# Patient Record
Sex: MALE | Race: WHITE | URBAN - METROPOLITAN AREA
[De-identification: names, ages, dates, MRNs, and addresses within clinical notes are randomized per-mention and may not be internally consistent; named-entity substitution may affect disease eponyms.]

---

## 2021-04-28 ENCOUNTER — OFFICE VISIT (OUTPATIENT)
Dept: ORTHOPEDIC SURGERY | Age: 31
End: 2021-04-28
Payer: COMMERCIAL

## 2021-04-28 VITALS — HEIGHT: 69 IN | BODY MASS INDEX: 24.44 KG/M2 | TEMPERATURE: 98.6 F | WEIGHT: 165 LBS

## 2021-04-28 DIAGNOSIS — S52.125A CLOSED NONDISPLACED FRACTURE OF HEAD OF LEFT RADIUS, INITIAL ENCOUNTER: Primary | ICD-10-CM

## 2021-04-28 DIAGNOSIS — M25.522 LEFT ELBOW PAIN: ICD-10-CM

## 2021-04-28 PROCEDURE — 99204 OFFICE O/P NEW MOD 45 MIN: CPT | Performed by: ORTHOPAEDIC SURGERY

## 2021-04-28 RX ORDER — KETOROLAC TROMETHAMINE 10 MG/1
TABLET, FILM COATED ORAL
COMMUNITY
Start: 2021-04-27

## 2021-04-28 SDOH — HEALTH STABILITY: MENTAL HEALTH: HOW MANY STANDARD DRINKS CONTAINING ALCOHOL DO YOU HAVE ON A TYPICAL DAY?: NOT ASKED

## 2021-04-28 NOTE — PROGRESS NOTES
Date:  2021    Name:  Moraima Muñiz  Address:  [address] [place] [address] Road  [address] [place] [address]    :  1990      Age:   26 y.o.    SSN:  [id number]      Medical Record Number:  <[id number]>    Reason for Visit:    Chief Complaint    Elbow Injury (new patient left elbow )      DOS:2021     HPI: Moraima Muñiz is a 26 y.o. male here today for new patient evaluation of the left elbow. Patient was skateboarding yesterday when he fell directly onto the left shoulder and elbow. States that since then he has had difficulty with weightbearing and motion of the elbow especially with supination and pronation. He has been wearing a sling since yesterday which has helped. ROS: All systems reviewed on patient intake form. Pertinent items are noted in HPI. No past medical history on file. No past surgical history on file. No family history on file.     Social History     Socioeconomic History    Marital status: Unknown     Spouse name: Not on file    Number of children: Not on file    Years of education: Not on file    Highest education level: Not on file   Occupational History    Not on file   Social Needs    Financial resource strain: Not on file    Food insecurity     Worry: Not on file     Inability: Not on file    Transportation needs     Medical: Not on file     Non-medical: Not on file   Tobacco Use    Smoking status: Not on file   Substance and Sexual Activity    Alcohol use: Not on file    Drug use: Not on file    Sexual activity: Not on file   Lifestyle    Physical activity     Days per week: Not on file     Minutes per session: Not on file    Stress: Not on file   Relationships    Social connections     Talks on phone: Not on file     Gets together: Not on file     Attends Hoahaoism service: Not on file     Active member of club or organization: Not on file     Attends meetings of clubs or organizations: Not on file     Relationship status: Not on file    Intimate partner Number of Occurrences:   1     Standing Expiration Date:   4/28/2022     Order Specific Question:   Reason for exam:     Answer:   pain     Total time spent for evaluation, education and development of treatment plan: 39 minutes    Sundeep Becerra, [address] [facility]     04/28/21  4:30 PM     I attest that I met personally with the patient, performed the described exam, reviewed the radiographic studies and medical records associated with this patient and supervised the services that are described above.      Tutu Glaser MD

## 2021-04-29 ENCOUNTER — TELEPHONE (OUTPATIENT)
Dept: ORTHOPEDIC SURGERY | Age: 31
End: 2021-04-29

## 2021-05-05 ENCOUNTER — OFFICE VISIT (OUTPATIENT)
Dept: ORTHOPEDIC SURGERY | Age: 31
End: 2021-05-05
Payer: COMMERCIAL

## 2021-05-05 DIAGNOSIS — S52.125D CLOSED NONDISPLACED FRACTURE OF HEAD OF LEFT RADIUS WITH ROUTINE HEALING, SUBSEQUENT ENCOUNTER: Primary | ICD-10-CM

## 2021-05-05 DIAGNOSIS — M25.522 LEFT ELBOW PAIN: ICD-10-CM

## 2021-05-05 PROCEDURE — 99214 OFFICE O/P EST MOD 30 MIN: CPT | Performed by: ORTHOPAEDIC SURGERY

## 2021-05-05 NOTE — PROGRESS NOTES
Date:  2021    Name:  Andrea Vasquez  Address:  [address] [place] [address]    :  1990      Age:   26 y.o.    SSN:  [id number]      Medical Record Number:  <[id number]>    Reason for Visit:    Chief Complaint    Follow-up (Left radial head fx)      DOS:2021     HPI: Andrea Vasquez is a 26 y.o. male here for follow-up. He sustained a left Gurjit type I radial head fracture on . He fell directly onto the elbow while skateboarding. He is being treated nonoperatively. States that his pain is improving. He still has some limitation in regards to range of motion. He has the sling but has been using it intermittently. ROS: Review of systems reviewed from Patient History Form completed today and available in the patient's chart under the Media tab. No past medical history on file. No past surgical history on file. No family history on file.     Social History     Socioeconomic History    Marital status: Unknown     Spouse name: Not on file    Number of children: Not on file    Years of education: Not on file    Highest education level: Not on file   Occupational History    Not on file   Social Needs    Financial resource strain: Not on file    Food insecurity     Worry: Not on file     Inability: Not on file    Transportation needs     Medical: Not on file     Non-medical: Not on file   Tobacco Use    Smoking status: Never Smoker    Smokeless tobacco: Never Used   Substance and Sexual Activity    Alcohol use: Never     Frequency: Never     Binge frequency: Never    Drug use: Never    Sexual activity: Not on file   Lifestyle    Physical activity     Days per week: Not on file     Minutes per session: Not on file    Stress: Not on file   Relationships    Social connections     Talks on phone: Not on file     Gets together: Not on file     Attends [employer]ian service: Not on file     Active member of club or organization: Not on file     Attends meetings of The roles of activity medication, antiinflammatories, injections, bracing, physical therapy, and surgical interventions were all described to the patient and questions were answered.    -This can be treated nonoperatively. -He will work on stretching and ROM exercises for the elbow on his own  -He will return in 2 weeks time with repeat radiographs and start formal PT at that time     Todd Olson is in agreement with this plan. All questions were answered to patient's satisfaction and was encouraged to call with any further questions. Orders Placed This Encounter   Procedures    XR ELBOW LEFT (MIN 3 VIEWS)     Standing Status:   Future     Number of Occurrences:   1     Standing Expiration Date:   5/4/2022     Order Specific Question:   Reason for exam:     Answer:   elbow pain         Total time spent for evaluation, education and development of treatment plan: 39 minutes      Sarita Del Real MD  Orthopaedic Fellow  [facility] and [address] [place] [address]     I attest that I met personally with the patient, performed the described exam, reviewed the radiographic studies and medical records associated with this patient and supervised the services that are described above.      Miller Hardwick MD

## 2021-05-19 ENCOUNTER — OFFICE VISIT (OUTPATIENT)
Dept: ORTHOPEDIC SURGERY | Age: 31
End: 2021-05-19

## 2021-05-19 VITALS — HEIGHT: 69 IN | WEIGHT: 165 LBS | BODY MASS INDEX: 24.44 KG/M2

## 2021-05-19 DIAGNOSIS — M25.522 LEFT ELBOW PAIN: ICD-10-CM

## 2021-05-19 DIAGNOSIS — S52.125D CLOSED NONDISPLACED FRACTURE OF HEAD OF LEFT RADIUS WITH ROUTINE HEALING, SUBSEQUENT ENCOUNTER: Primary | ICD-10-CM

## 2021-05-19 PROCEDURE — 99024 POSTOP FOLLOW-UP VISIT: CPT | Performed by: ORTHOPAEDIC SURGERY

## 2021-05-19 NOTE — PROGRESS NOTES
Food in the Last Year:    [address] in the Last Year:    Transportation Needs:     Lack of Transportation (Medical):  Lack of Transportation (Non-Medical):    Physical Activity:     Days of Exercise per Week:     Minutes of Exercise per Session:    Stress:     Feeling of Stress :    Social Connections:     Frequency of Communication with Friends and Family:     Frequency of Social Gatherings with Friends and Family:     Attends Islam Services:     Active Member of Clubs or Organizations:     Attends Club or Organization Meetings:     Marital Status:    Intimate Partner Violence:     Fear of Current or Ex-Partner:     Emotionally Abused:     Physically Abused:     Sexually Abused:        Current Outpatient Medications   Medication Sig Dispense Refill    ketorolac (TORADOL) 10 MG tablet        No current facility-administered medications for this visit. No Known Allergies    Vital signs:  Ht 5' 9\" (1.753 m)   Wt 165 lb (74.8 kg)   BMI 24.37 kg/m²              Elbow exam - Left  Inspection: No gross deformities, no signs of infection. Palpation: No tenderness to palpation overlying the lateral or medial epicondyle. nontender overlying radial head  Active Range of Motion:  5-140. Lacks 5 degrees of supination , lacks 5 degrees pronation compared to the contralateral side  Special Tests: Negative Hook test.   Neurovascular: Sensation to light touch is intact, no motor deficits, palpable radial pulses 2+     Elbow exam -Right  Inspection: No gross deformities, no signs of infection. Palpation: No tenderness to palpation overlying the lateral or medial epicondyle. Active Range of Motion:  0-140  Passive Range of Motion: No restrictions  Special Tests: Negative Hook test.   Neurovascular: Sensation to light touch is intact, no motor deficits, palpable radial pulses 2+          Diagnostics:  Radiology:     3 views of the left elbow: No fractures. No dislocations.   No change in alignment of the radial head fracture      Assessment: 17-year-old right-hand-dominant male with left nondisplaced radial head fracture sustained 3 weeks ago on 4/27    Plan:     -Continue nonweightbearing to left upper extremity. We will get him into physical therapy to work on continued stretching  -He will return in 4 weeks time with repeat radiographs     Josie Benito is in agreement with this plan. All questions were answered to patient's satisfaction and was encouraged to call with any further questions. Orders Placed This Encounter   Procedures    XR ELBOW LEFT (MIN 3 VIEWS)     Standing Status:   Future     Number of Occurrences:   1     Standing Expiration Date:   5/18/2022     Order Specific Question:   Reason for exam:     Answer:   elbow pain         Za Garay MD  Orthopaedic Fellow  [facility]     I attest that I met personally with the patient, performed the described exam, reviewed the radiographic studies and medical records associated with this patient and supervised the services that are described above.      Hanford Cranker MD

## 2021-05-21 ENCOUNTER — HOSPITAL ENCOUNTER (OUTPATIENT)
Dept: PHYSICAL THERAPY | Age: 31
Setting detail: THERAPIES SERIES
Discharge: HOME | End: 2021-05-21
Payer: COMMERCIAL

## 2021-05-21 PROCEDURE — 97161 PT EVAL LOW COMPLEX 20 MIN: CPT | Performed by: PHYSICAL THERAPIST

## 2021-05-21 PROCEDURE — 97110 THERAPEUTIC EXERCISES: CPT | Performed by: PHYSICAL THERAPIST

## 2021-05-21 NOTE — PLAN OF CARE
The [address] [place] [address]  Phone [phone]  Fax [phone]                                                       Physical Therapy Certification    Dear Referring Practitioner: Maxine Veras,    We had the pleasure of evaluating the following patient for physical therapy services at [address] [place] [address]. A summary of our findings can be found in the initial assessment below. This includes our plan of care. If you have any questions or concerns regarding these findings, please do not hesitate to contact me at the office phone number checked above. Thank you for the referral.       Physician Signature:_______________________________Date:__________________  By signing above (or electronic signature), therapists plan is approved by physician      Patient: Andrea Vasquez   : 1990   MRN: [id number]  Referring Physician: Referring Practitioner: Maxine Veras      Evaluation Date: 2021      Medical Diagnosis Information:  Diagnosis: S52.125D (ICD-10-CM) - Closed nondisplaced fracture of head of left radius with routine healing, subsequent encounter   Treatment Diagnosis: M25. 522 pain in left elbow                                         Insurance information: PT Insurance Information: [employer]uv[employer] Landon/[employer] vpcy/no copay/ no auth    Precautions/ Contra-indications: non weight bearing    C-SSRS Triggered by Intake questionnaire (Past 2 wk assessment):   [x] No, Questionnaire did not trigger screening.   [] Yes, Patient intake triggered further evaluation      [] C-SSRS Screening completed  [] PCP notified via Plan of Care  [] Emergency services notified     Latex Allergy:  [x]NO      []YES  Preferred Language for Healthcare:   [x]English       []other:    SUBJECTIVE: Patient stated complaint: Patient reports to clinic today fo evaluation of the left elbow.  Patient was skateboarding on 21 when he fell directly onto the left shoulder and elbow. He had difficulty with weightbearing and motion of the elbow especially with supination and pronation after the injury. He was wearing a sling for a short period of time. Saw Dr Peggy Taylor on 4/28/21. X-rays (+) minimally displaced radial head fracture. He has been treated conservatively. Relevant Medical History:unremarkable  Functional Disability Index: UEFI 22% LOF    Pain Scale: 0/10 current  2/10  Easing factors: rest, icing  Provocative factors: weightbearing, lifting, end-range movement     Type: []Constant   [x]Intermittent  []Radiating [x]Localized []other:     Numbness/Tingling: none    Occupation/School: non working    Living Status/Prior Level of Function: Independent with ADLs and IADLs    OBJECTIVE:     ROM PROM AROM Comments    Left Right Left Right    Elbow flexion   145     Elbow extension   -9     Pronation   74     Supination   65     Wrist flexion   76     Wrist extension   72     Wrist radial deviation        Wrist ulnar deviation          Special Tests Left Right Comments   Cozens      Tinels      Phalens                          Strength Left Right Comments   Elbow flexion 4-     Elbow extension 4-     Pronation 4-     Supination 4-     Wrist flexion 4-     Wrist extension 4-     Wrist radial deviation      Wrist ulnar deviation         Left Right Comments   Level 1 56 76    Level 2      Level 3      Level 4      Level 5      other        Reflexes/Sensation:    [x]Dermatomes/Myotomes intact    []Reflexes equal and normal bilaterally   []Other:    Joint mobility: NT   []Normal    []Hypo   []Hyper    Palpation: NT    Functional Mobility/Transfers: independent    Posture: normal    Bandages/Dressings/Incisions: NA    Gait: (include devices/WB status): WNL                       [x] Patient history, allergies, meds reviewed. Medical chart reviewed. See intake form.      Review Of Systems (ROS):  [x]Performed Review of systems (Integumentary, CardioPulmonary, Neurological) by intake and observation. Intake form has been scanned into medical record. Patient has been instructed to contact their primary care physician regarding ROS issues if not already being addressed at this time. Co-morbidities/Complexities (which will affect course of rehabilitation):   [x]None           Arthritic conditions   []Rheumatoid arthritis (M05.9)  []Osteoarthritis (M19.91)   Cardiovascular conditions   []Hypertension (I10)  []Hyperlipidemia (E78.5)  []Angina pectoris (I20)  []Atherosclerosis (I70)   Musculoskeletal conditions   []Disc pathology   []Congenital spine pathologies   []Prior surgical intervention  []Osteoporosis (M81.8)  []Osteopenia (M85.8)   Endocrine conditions   []Hypothyroid (E03.9)  []Hyperthyroid Gastrointestinal conditions   []Constipation (J46.04)   Metabolic conditions   []Morbid obesity (E66.01)  []Diabetes type 1(E10.65) or 2 (E11.65)   []Neuropathy (G60.9)     Pulmonary conditions   []Asthma (J45)  []Coughing   []COPD (J44.9)   Psychological Disorders  []Anxiety (F41.9)  []Depression (F32.9)   []Other:   []Other:          Barriers to/and or personal factors that will affect rehab potential:              []Age  []Sex              []Motivation/Lack of Motivation                        []Co-Morbidities              []Cognitive Function, education/learning barriers              []Environmental, home barriers              []profession/work barriers  []past PT/medical experience  []other:  Justification:     Falls Risk Assessment (30 days):   [x] Falls Risk assessed and no intervention required.   [] Falls Risk assessed and Patient requires intervention due to being higher risk   TUG score (>12s at risk):     [] Falls education provided, including        ASSESSMENT:   Functional Impairments   [x]Noted spinal or UE joint hypomobility   []Noted spinal or UE joint hypermobility   [x]Decreased UE functional ROM   [x]Decreased UE functional strength   []Abnormal reflexes/sensation/myotomal/dermatomal deficits   []Decreased RC/scapular/core strength and neuromuscular control   []other:      Functional Activity Limitations (from functional questionnaire and intake)   []Reduced ability to tolerate prolonged functional positions   []Reduced ability or difficulty with changes of positions or transfers between positions   [x]Reduced ability to maintain good posture and demonstrate good body mechanics with sitting, bending, and lifting   [x] Reduced ability or tolerance with driving and/or computer work   [x]Reduced ability to sleep   [x]Reduced ability to perform lifting, reaching, carrying tasks   [x]Reduced ability to tolerate impact through UE   []Reduced ability to reach behind back   [x]Reduced ability to  or hold objects   [x]Reduced ability to throw or toss an object   []other:      Participation Restrictions   [x]Reduced participation in self care activities   [x]Reduced participation in home management activities   [x]Reduced participation in work activities   [x]Reduced participation in social activities. [x]Reduced participation in sport / recreational activities. Classification:   []Signs/symptoms consistent with post-surgical status including decreased ROM, strength and function.   []Signs/symptoms consistent with joint sprain/strain   []Signs/symptoms consistent with shoulder impingement   []Signs/symptoms consistent with shoulder/elbow/wrist tendinopathy   []Signs/symptoms consistent with Rotator cuff tear   []Signs/symptoms consistent with labral tear   []Signs/symptoms consistent with postural dysfunction    []Signs/symptoms consistent with Glenohumeral IR Deficit - <45 degrees   []Signs/symptoms consistent with facet dysfunction of cervical/thoracic spine    []Signs/symptoms consistent with pathology which may benefit from Dry needling     [x]other: Signs/symptoms consistent with radial head fracture    Prognosis/Rehab Potential: []Excellent   [x]Good    []Fair   []Poor    Tolerance of evaluation/treatment:    []Excellent   [x]Good    []Fair   []Poor    Physical Therapy Evaluation Complexity Justification  [x] A history of present problem with:  [x] no personal factors and/or comorbidities that impact the plan of care;  []1-2 personal factors and/or comorbidities that impact the plan of care  []3 personal factors and/or comorbidities that impact the plan of care  [x] An examination of body systems using standardized tests and measures addressing any of the following: body structures and functions (impairments), activity limitations, and/or participation restrictions;:  [] a total of 1-2 or more elements   [x] a total of 3 or more elements   [] a total of 4 or more elements   [x] A clinical presentation with:  [x] stable and/or uncomplicated characteristics   [] evolving clinical presentation with changing characteristics  [] unstable and unpredictable characteristics;   [x] Clinical decision making of [x] low, [] moderate, [] high complexity using standardized patient assessment instrument and/or measurable assessment of functional outcome. [x] EVAL (LOW) 60175 (typically 20 minutes face-to-face)  [] EVAL (MOD) 06790 (typically 30 minutes face-to-face)  [] EVAL (HIGH) 59648 (typically 45 minutes face-to-face)  [] RE-EVAL       PLAN:  Frequency/Duration:  2 days per week for 6 Weeks:  INTERVENTIONS:  [x] Therapeutic exercise including: strength training, ROM, for Upper extremity and core   [x]  NMR activation and proprioception for UE, scap and Core   [x] Manual therapy as indicated for shoulder, scapula and spine to include: Dry Needling/IASTM, STM, PROM, Gr I-IV mobilizations, manipulation. [x] Modalities as needed that may include: thermal agents, E-stim, Biofeedback, US, iontophoresis as indicated  [x] Patient education on joint protection, postural re-education, activity modification, progression of HEP.     HEP instruction: Access Code: [email or web address]  URL: [email or web address]  Date: 05/21/2021  Prepared by: Jack Her    Exercises  Supported Elbow Flexion Extension PROM - 2 x daily - 7 x weekly - 1 sets - 10 reps - 10 hold  Elbow Extension PROM - 2 x daily - 7 x weekly - 1 sets - 10 reps - 10 hold  Seated Wrist Supination Stretch - 2 x daily - 7 x weekly - 1 sets - 10 reps - 10 hold  Wrist Pronation Stretch - 2 x daily - 7 x weekly - 1 sets - 10 reps - 10 hold  Seated Wrist Extension Stretch - 2 x daily - 7 x weekly - 1 sets - 5 reps - 30 hold  Seated Wrist Flexion Stretch - 2 x daily - 7 x weekly - 1 sets - 5 reps - 30 hold  Ice - 2-3 x daily - 7 x weekly - 15 minutes hold      GOALS:   Patient stated goal: Able to sleep throughout the night without pain/dysfunction. [] Progressing: [] Met: [] Not Met: [] Adjusted    Therapist goals for Patient:   Short Term Goals: To be achieved in: 2 weeks  1. Independent in HEP and progression per patient tolerance, in order to prevent re-injury. [] Progressing: [] Met: [] Not Met: [] Adjusted   2. Patient will have a decrease in pain to facilitate improvement in movement, function, and ADLs as indicated by Functional Deficits. [] Progressing: [] Met: [] Not Met: [] Adjusted    Long Term Goals: To be achieved in: 6 weeks  1. Disability index score of 10% or less for the UEFS to assist with reaching prior level of function. [] Progressing: [] Met: [] Not Met: [] Adjusted  2. Patient will demonstrate increased AROM to WNL to allow for proper joint functioning as indicated by patients Functional Deficits. [] Progressing: [] Met: [] Not Met: [] Adjusted  3. Patient will demonstrate an increase in strength to good scapular and core control  for UE to allow for proper functional mobility as indicated by patients Functional Deficits. [] Progressing: [] Met: [] Not Met: [] Adjusted  4. Patient will return to all functional activities without increased symptoms or restriction.    [] Progressing: [] Met: [] Not Met: [] Adjusted  5. Patient will be able to lift 10lbs in his left hand without pain/dysfunction. [] Progressing: [] Met: [] Not Met: [] Adjusted     Electronically signed by:  Jos Gusman PT      Note: If patient does not return for scheduled/ recommended follow up visits, this note will serve as a discharge from care along with most recent update on progress.

## 2021-05-21 NOTE — FLOWSHEET NOTE
The [facility] and [facility], [address] [place] [address] [address], [address]  Phone: [phone]   Fax:     [phone]    Physical Therapy Daily Treatment Note  Date:  2021    Patient Name:  Maxine Yu    :  1990  MRN: [id number]  Restrictions/Precautions:    Medical/Treatment Diagnosis Information:  · Diagnosis: S52.125D (ICD-10-CM) - Closed nondisplaced fracture of head of left radius with routine healing, subsequent encounter  · Treatment Diagnosis: M25. 522 pain in left elbow  Insurance/Certification information:  PT Insurance Information: [employer]/30 vpcy/no copay/ no auth  Physician Information:  Referring Practitioner: Coleen Tolliver  Has the plan of care been signed (Y/N):        []  Yes  [x]  No     Date of Patient follow up with Physician:       Is this a Progress Report:     []  Yes  [x]  No        If Yes:  Date Range for reporting period:  Beginning  Ending    Progress report will be due (10 Rx or 30 days whichever is less):        Recertification will be due (POC Duration  / 90 days whichever is less): 6 weeks         Visit # Insurance Allowable Auth Required   1 30 []  Yes [x]  No        Functional Scale: UEFI 22% LOF    Date assessed:       Latex Allergy:  [x]NO      []YES  Preferred Language for Healthcare:   [x]English       []other:    Pain level:  210     SUBJECTIVE:  See eval    OBJECTIVE: See eval   Observation:    Test measurements:      RESTRICTIONS/PRECAUTIONS: NWB    Exercises/Interventions:   Exercises:  Exercise/Equipment Resistance/Repetitions Other comments   Stretching/PROM     Wrist flexor stretch 5x30\"    Wrist extensor stretch 5x30\"    Elbow flexion with OP 10x10\"    Elbow extension with OP 10x10\"    Sup/pro with OP 10x10\"         Isometrics     Retraction          Weight shift     Flexion     Abduction     External Rotation     Internal Rotation     Biceps     Triceps          PRE's demonstrate an increase in strength to good scapular and core control  for UE to allow for proper functional mobility as indicated by patients Functional Deficits. []? Progressing: []? Met: []? Not Met: []? Adjusted  4. Patient will return to all functional activities without increased symptoms or restriction. []? Progressing: []? Met: []? Not Met: []? Adjusted  5. Patient will be able to lift 10lbs in his left hand without pain/dysfunction. []? Progressing: []? Met: []? Not Met: []? Adjusted         Overall Progression Towards Functional goals/ Treatment Progress Update:  [] Patient is progressing as expected towards functional goals listed. [] Progression is slowed due to complexities/Impairments listed. [] Progression has been slowed due to co-morbidities. [x] Plan just implemented, too soon to assess goals progression <30days   [] Goals require adjustment due to lack of progress  [] Patient is not progressing as expected and requires additional follow up with physician  [] Other    Prognosis for POC: [x] Good [] Fair  [] Poor      Patient requires continued skilled intervention: [x] Yes  [] No    Treatment/Activity Tolerance:  [x] Patient able to complete treatment  [] Patient limited by fatigue  [] Patient limited by pain     [] Patient limited by other medical complications  [] Other:                   Patient Education:      Access Code: [email or web address]  URL: [email or web address]  Date: 05/21/2021  Prepared by: Sera Franklin     Exercises  Supported Elbow Flexion Extension PROM - 2 x daily - 7 x weekly - 1 sets - 10 reps - 10 hold  Elbow Extension PROM - 2 x daily - 7 x weekly - 1 sets - 10 reps - 10 hold  Seated Wrist Supination Stretch - 2 x daily - 7 x weekly - 1 sets - 10 reps - 10 hold  Wrist Pronation Stretch - 2 x daily - 7 x weekly - 1 sets - 10 reps - 10 hold  Seated Wrist Extension Stretch - 2 x daily - 7 x weekly - 1 sets - 5 reps - 30 hold  Seated Wrist Flexion Stretch - 2 x daily - 7 x weekly - 1 sets - 5 reps - 30 hold  Ice - 2-3 x daily - 7 x weekly - 15 minutes hold                PLAN: See eval  [] Continue per plan of care [] Alter current plan (see comments above)  [x] Plan of care initiated [] Hold pending MD visit [] Discharge      Electronically signed by:  Joe Marte, PT    Note: If patient does not return for scheduled/ recommended follow up visits, this note will serve as a discharge from care along with most recent update on progress.

## 2021-05-25 ENCOUNTER — HOSPITAL ENCOUNTER (OUTPATIENT)
Dept: PHYSICAL THERAPY | Age: 31
Setting detail: THERAPIES SERIES
Discharge: HOME | End: 2021-05-25
Payer: COMMERCIAL

## 2021-05-25 PROCEDURE — 97110 THERAPEUTIC EXERCISES: CPT | Performed by: PHYSICAL THERAPIST

## 2021-05-25 NOTE — FLOWSHEET NOTE
The [address] [place] [address],[address], [facility] [place] [facility] [address], [address] [place] [address]  Phone: [phone]   Fax:     [phone]    Physical Therapy Daily Treatment Note  Date:  2021    Patient Name:  Annie Peres    :  1990  MRN: [id number]  Restrictions/Precautions:    Medical/Treatment Diagnosis Information:  · Diagnosis: S52.125D (ICD-10-CM) - Closed nondisplaced fracture of head of left radius with routine healing, subsequent encounter  · Treatment Diagnosis: M25. 522 pain in left elbow  Insurance/Certification information:  PT Insurance Information: [employer] vpcy/no copay/ no auth  Physician Information:  Referring Practitioner: Phil Beard  Has the plan of care been signed (Y/N):        []  Yes  [x]  No     Date of Patient follow up with Physician:       Is this a Progress Report:     []  Yes  [x]  No        If Yes:  Date Range for reporting period:  Beginning  Ending    Progress report will be due (10 Rx or 30 days whichever is less):        Recertification will be due (POC Duration  / 90 days whichever is less): 6 weeks         Visit # Insurance Allowable Auth Required   2 30 []  Yes [x]  No        Functional Scale: UEFI 22% LOF    Date assessed:       Latex Allergy:  [x]NO      []YES  Preferred Language for Healthcare:   [x]English       []other:    Pain level:  210     SUBJECTIVE:    Reports that his elbow is doing okay. States that he was a little sore after last visit. Notes that he did his exercises yesterday and he felt fine afterwards. Notes that his mobility may be a little better.       OBJECTIVE: See eval   Observation:    Test measurements:      RESTRICTIONS/PRECAUTIONS: NWB    Exercises/Interventions:   Exercises:  Exercise/Equipment Resistance/Repetitions Other comments   Stretching/PROM     Wrist flexor stretch 5x30\"    Wrist extensor stretch 5x30\"    Elbow flexion with OP 10x10\"    Elbow extension with OP 10x10\"    Sup/pro with OP 10x10\"         Isometrics     Retraction      3' Added 5/25   Weight shift     Flexion     Abduction     External Rotation     Internal Rotation     Biceps     Triceps          PRE's     Flexion     Abduction     External Rotation     Internal Rotation     Shrugs     EXT     Reverse Flys     Serratus     Horizontal Abd with ER     Biceps 3lbs 3x10 Added 5/25   Triceps     Wrist ext 2lbs 3x10 Added 5?25   Wrist flex 2lbs 3x10 Added 5/25   Sup/pro 2lbs 3x10 Added 5/25   Rubber band finger extension 3x10 Added 5/25   Retraction          Cable Column/Theraband     External Rotation     Internal Rotation     Shrugs     Lats     Ext     Flex     Scapular Retraction     BIC     TRIC Blue 3x10 Added 5/25   PNF          Dynamic Stability          Plyoback          Manual interventions                     Therapeutic Exercise and NMR EXR  [x] (28894) Provided verbal/tactile cueing for activities related to strengthening, flexibility, endurance, ROM  for improvements in scapular, scapulothoracic and UE control with self care, reaching, carrying, lifting, house/yardwork, driving/computer work.    [] (16331) Provided verbal/tactile cueing for activities related to improving balance, coordination, kinesthetic sense, posture, motor skill, proprioception  to assist with  scapular, scapulothoracic and UE control with self care, reaching, carrying, lifting, house/yardwork, driving/computer work. Therapeutic Activities:    [] (36170 or 86068) Provided verbal/tactile cueing for activities related to improving balance, coordination, kinesthetic sense, posture, motor skill, proprioception and motor activation to allow for proper function of scapular, scapulothoracic and UE control with self care, carrying, lifting, driving/computer work.      Home Exercise Program:    [x] (11328) Reviewed/Progressed HEP activities related to strengthening, flexibility, endurance, ROM of scapular, scapulothoracic and UE control with self care, reaching, carrying, lifting, house/yardwork, driving/computer work  [] (30736) Reviewed/Progressed HEP activities related to improving balance, coordination, kinesthetic sense, posture, motor skill, proprioception of scapular, scapulothoracic and UE control with self care, reaching, carrying, lifting, house/yardwork, driving/computer work      Manual Treatments:  PROM / STM / Oscillations-Mobs:  G-I, II, III, IV (PA's, Inf., Post.)  [] (92803) Provided manual therapy to mobilize soft tissue/joints of cervical/CT, scapular GHJ and UE for the purpose of modulating pain, promoting relaxation,  increasing ROM, reducing/eliminating soft tissue swelling/inflammation/restriction, improving soft tissue extensibility and allowing for proper ROM for normal function with self care, reaching, carrying, lifting, house/yardwork, driving/computer work    Modalities:  Ice 15'    Charges:  Timed Code Treatment Minutes: 38'   Total Treatment Minutes: 1:00-1:55  55'       [] EVAL (LOW) 57862 (typically 20 minutes face-to-face)  [] EVAL (MOD) 98160 (typically 30 minutes face-to-face)  [] EVAL (HIGH) 47174 (typically 45 minutes face-to-face)  [] RE-EVAL     [x] PX(76938) x 3    [] IONTO  [] NMR (92452) x     [] VASO  [] Manual ([id number]) x      [] Other:  [] TA x      [] Mech Traction (39757)  [] ES(attended) (58777)      [] ES (un) (39512):     GOALS:    Patient stated goal: Able to sleep throughout the night without pain/dysfunction. []? Progressing: []? Met: []? Not Met: []? Adjusted     Therapist goals for Patient:   Short Term Goals: To be achieved in: 2 weeks  1. Independent in HEP and progression per patient tolerance, in order to prevent re-injury. []? Progressing: []? Met: []? Not Met: []? Adjusted   2. Patient will have a decrease in pain to facilitate improvement in movement, function, and ADLs as indicated by Functional Deficits. []? Progressing: []? Met: []?  Not Met: []? Adjusted     Long Term Goals: To be achieved in: 6 weeks  1. Disability index score of 10% or less for the UEFS to assist with reaching prior level of function. []? Progressing: []? Met: []? Not Met: []? Adjusted  2. Patient will demonstrate increased AROM to WNL to allow for proper joint functioning as indicated by patients Functional Deficits. []? Progressing: []? Met: []? Not Met: []? Adjusted  3. Patient will demonstrate an increase in strength to good scapular and core control  for UE to allow for proper functional mobility as indicated by patients Functional Deficits. []? Progressing: []? Met: []? Not Met: []? Adjusted  4. Patient will return to all functional activities without increased symptoms or restriction. []? Progressing: []? Met: []? Not Met: []? Adjusted  5. Patient will be able to lift 10lbs in his left hand without pain/dysfunction. []? Progressing: []? Met: []? Not Met: []? Adjusted         Overall Progression Towards Functional goals/ Treatment Progress Update:  [] Patient is progressing as expected towards functional goals listed. [] Progression is slowed due to complexities/Impairments listed. [] Progression has been slowed due to co-morbidities. [x] Plan just implemented, too soon to assess goals progression <30days   [] Goals require adjustment due to lack of progress  [] Patient is not progressing as expected and requires additional follow up with physician  [] Other    Prognosis for POC: [x] Good [] Fair  [] Poor      Patient requires continued skilled intervention: [x] Yes  [] No    Treatment/Activity Tolerance:  [x] Patient able to complete treatment  [] Patient limited by fatigue  [] Patient limited by pain     [] Patient limited by other medical complications  [x] Other: 5/25  No complaints with today's program.  ROM restrictions are still present. Added some light PREs today without problem.   No pain noted with program.                  Patient Education:      Access Code: [email or web address]  URL: [email or web address]  Date: 05/21/2021  Prepared by: Matt Contreras     Exercises  Supported Elbow Flexion Extension PROM - 2 x daily - 7 x weekly - 1 sets - 10 reps - 10 hold  Elbow Extension PROM - 2 x daily - 7 x weekly - 1 sets - 10 reps - 10 hold  Seated Wrist Supination Stretch - 2 x daily - 7 x weekly - 1 sets - 10 reps - 10 hold  Wrist Pronation Stretch - 2 x daily - 7 x weekly - 1 sets - 10 reps - 10 hold  Seated Wrist Extension Stretch - 2 x daily - 7 x weekly - 1 sets - 5 reps - 30 hold  Seated Wrist Flexion Stretch - 2 x daily - 7 x weekly - 1 sets - 5 reps - 30 hold  Ice - 2-3 x daily - 7 x weekly - 15 minutes hold                PLAN:   [x] Continue per plan of care [] Alter current plan (see comments above)  [] Plan of care initiated [] Hold pending MD visit [] Discharge    Progress per restrictions. Electronically signed by:  Alycia Bryson PT    Note: If patient does not return for scheduled/ recommended follow up visits, this note will serve as a discharge from care along with most recent update on progress.

## 2021-05-28 ENCOUNTER — HOSPITAL ENCOUNTER (OUTPATIENT)
Dept: PHYSICAL THERAPY | Age: 31
Setting detail: THERAPIES SERIES
Discharge: HOME | End: 2021-05-28
Payer: COMMERCIAL

## 2021-05-28 PROCEDURE — 97110 THERAPEUTIC EXERCISES: CPT | Performed by: PHYSICAL THERAPIST

## 2021-05-28 NOTE — FLOWSHEET NOTE
The [facility], [address] [facility] [address], [address] [place] [address]  Phone: [phone]   Fax:     [phone]    Physical Therapy Daily Treatment Note  Date:  2021    Patient Name:  Mara Suarez    :  1990  MRN: [id number]  Restrictions/Precautions:    Medical/Treatment Diagnosis Information:  · Diagnosis: S52.125D (ICD-10-CM) - Closed nondisplaced fracture of head of left radius with routine healing, subsequent encounter  · Treatment Diagnosis: M25. 522 pain in left elbow  Insurance/Certification information:  PT Insurance Information: Reji Landon/[employer] vpcy/no copay/ no auth  Physician Information:  Referring Practitioner: Kathi Villasenor  Has the plan of care been signed (Y/N):        []  Yes  [x]  No     Date of Patient follow up with Physician:       Is this a Progress Report:     []  Yes  [x]  No        If Yes:  Date Range for reporting period:  Beginning  Ending    Progress report will be due (10 Rx or 30 days whichever is less): 38       Recertification will be due (POC Duration  / 90 days whichever is less): 6 weeks         Visit # Insurance Allowable Auth Required   3 30 []  Yes [x]  No        Functional Scale: UEFI 22% LOF    Date assessed:       Latex Allergy:  [x]NO      []YES  Preferred Language for Healthcare:   [x]English       []other:    Pain level:  2/10     SUBJECTIVE:    Reports that his elbow is doing okay. States that his pain is well managed. Reports partial compliance with HEP.       OBJECTIVE: See eval   Observation:    Test measurements:      RESTRICTIONS/PRECAUTIONS: NWB    Exercises/Interventions:   Exercises:  Exercise/Equipment Resistance/Repetitions Other comments   Stretching/PROM     Wrist flexor stretch 5x30\"    Wrist extensor stretch 5x30\"    Elbow flexion with OP 10x10\"    Elbow extension with OP 10x10\"    Sup/pro with OP 10x10\"         Isometrics     Retraction      3' Added  Weight shift     Flexion     Abduction     External Rotation     Internal Rotation     Biceps     Triceps          PRE's     Flexion     Abduction     External Rotation     Internal Rotation     Shrugs     EXT     Reverse Flys     Serratus     Horizontal Abd with ER     Biceps 3lbs 3x10 Added 5/25   Triceps     Wrist ext 2lbs 3x10 Added 5?25   Wrist flex 2lbs 3x10 Added 5/25   Sup/pro 2lbs 3x10 Added 5/25   Rubber band finger extension 3x10 Added 5/25   therabar flex/ext Red 3x10 each Added 5/28   therabar sup/pro Red 3x10 each Added 5/28        Cable Column/Theraband     External Rotation     Internal Rotation     Shrugs     Lats     Ext     Flex     Scapular Retraction     BIC     TRIC Blue 3x10 Added 5/25   PNF          Dynamic Stability          Plyoback          Manual interventions                     Therapeutic Exercise and NMR EXR  [x] (07023) Provided verbal/tactile cueing for activities related to strengthening, flexibility, endurance, ROM  for improvements in scapular, scapulothoracic and UE control with self care, reaching, carrying, lifting, house/yardwork, driving/computer work.    [] (37563) Provided verbal/tactile cueing for activities related to improving balance, coordination, kinesthetic sense, posture, motor skill, proprioception  to assist with  scapular, scapulothoracic and UE control with self care, reaching, carrying, lifting, house/yardwork, driving/computer work. Therapeutic Activities:    [] (21780 or 57590) Provided verbal/tactile cueing for activities related to improving balance, coordination, kinesthetic sense, posture, motor skill, proprioception and motor activation to allow for proper function of scapular, scapulothoracic and UE control with self care, carrying, lifting, driving/computer work.      Home Exercise Program:    [x] (02108) Reviewed/Progressed HEP activities related to strengthening, flexibility, endurance, ROM of scapular, scapulothoracic and UE control with self care, reaching, carrying, lifting, house/yardwork, driving/computer work  [] (16360) Reviewed/Progressed HEP activities related to improving balance, coordination, kinesthetic sense, posture, motor skill, proprioception of scapular, scapulothoracic and UE control with self care, reaching, carrying, lifting, house/yardwork, driving/computer work      Manual Treatments:  PROM / STM / Oscillations-Mobs:  G-I, II, III, IV (PA's, Inf., Post.)  [] (90328) Provided manual therapy to mobilize soft tissue/joints of cervical/CT, scapular GHJ and UE for the purpose of modulating pain, promoting relaxation,  increasing ROM, reducing/eliminating soft tissue swelling/inflammation/restriction, improving soft tissue extensibility and allowing for proper ROM for normal function with self care, reaching, carrying, lifting, house/yardwork, driving/computer work    Modalities:  Ice 15'    Charges:  Timed Code Treatment Minutes: 38'   Total Treatment Minutes: 9:24-10:18  48'       [] EVAL (LOW) 66641 (typically 20 minutes face-to-face)  [] EVAL (MOD) 13689 (typically 30 minutes face-to-face)  [] EVAL (HIGH) 29124 (typically 45 minutes face-to-face)  [] RE-EVAL     [x] HM(97527) x 3    [] IONTO  [] NMR (32785) x     [] VASO  [] Manual ([id number]) x      [] Other:  [] TA x      [] Mech Traction (12200)  [] ES(attended) (39930)      [] ES (un) (48751):     GOALS:    Patient stated goal: Able to sleep throughout the night without pain/dysfunction. []? Progressing: []? Met: []? Not Met: []? Adjusted     Therapist goals for Patient:   Short Term Goals: To be achieved in: 2 weeks  1. Independent in HEP and progression per patient tolerance, in order to prevent re-injury. []? Progressing: []? Met: []? Not Met: []? Adjusted   2. Patient will have a decrease in pain to facilitate improvement in movement, function, and ADLs as indicated by Functional Deficits. []? Progressing: []? Met: []? Not Met: []? Adjusted     Long Term Goals:  To be achieved in: 6 weeks  1. Disability index score of 10% or less for the UEFS to assist with reaching prior level of function. []? Progressing: []? Met: []? Not Met: []? Adjusted  2. Patient will demonstrate increased AROM to WNL to allow for proper joint functioning as indicated by patients Functional Deficits. []? Progressing: []? Met: []? Not Met: []? Adjusted  3. Patient will demonstrate an increase in strength to good scapular and core control  for UE to allow for proper functional mobility as indicated by patients Functional Deficits. []? Progressing: []? Met: []? Not Met: []? Adjusted  4. Patient will return to all functional activities without increased symptoms or restriction. []? Progressing: []? Met: []? Not Met: []? Adjusted  5. Patient will be able to lift 10lbs in his left hand without pain/dysfunction. []? Progressing: []? Met: []? Not Met: []? Adjusted         Overall Progression Towards Functional goals/ Treatment Progress Update:  [] Patient is progressing as expected towards functional goals listed. [] Progression is slowed due to complexities/Impairments listed. [] Progression has been slowed due to co-morbidities. [x] Plan just implemented, too soon to assess goals progression <30days   [] Goals require adjustment due to lack of progress  [] Patient is not progressing as expected and requires additional follow up with physician  [] Other    Prognosis for POC: [x] Good [] Fair  [] Poor      Patient requires continued skilled intervention: [x] Yes  [] No    Treatment/Activity Tolerance:  [x] Patient able to complete treatment  [] Patient limited by fatigue  [] Patient limited by pain     [] Patient limited by other medical complications  [x] Other: 5/28  No complaints with today's program.  ROM is progressing. Mild deficits were present when he first arrived to clinic. ROM appeared to be nearly normal upon completion of program.  Added therabar exercises today without problem.

## 2021-06-02 ENCOUNTER — HOSPITAL ENCOUNTER (OUTPATIENT)
Dept: PHYSICAL THERAPY | Age: 31
Setting detail: THERAPIES SERIES
Discharge: HOME | End: 2021-06-02
Payer: COMMERCIAL

## 2021-06-02 PROCEDURE — 97110 THERAPEUTIC EXERCISES: CPT

## 2021-06-02 NOTE — FLOWSHEET NOTE
The [address] [place], [place] [address], 020 [facility] [address], [address] [place] [address]  Phone: [phone]   Fax:     [phone]    Physical Therapy Daily Treatment Note  Date:  2021    Patient Name:  Todd Olson    :  1990  MRN: [id number]  Restrictions/Precautions:    Medical/Treatment Diagnosis Information:  · Diagnosis: S52.125D (ICD-10-CM) - Closed nondisplaced fracture of head of left radius with routine healing, subsequent encounter  · Treatment Diagnosis: M25. 522 pain in left elbow  Insurance/Certification information:  PT Insurance Information: [employer]/30 vpcy/no copay/ no auth  Physician Information:  Referring Practitioner: Elton Lane  Has the plan of care been signed (Y/N):        []  Yes  [x]  No     Date of Patient follow up with Physician:       Is this a Progress Report:     []  Yes  [x]  No        If Yes:  Date Range for reporting period:  Beginning  Ending    Progress report will be due (10 Rx or 30 days whichever is less): 27       Recertification will be due (POC Duration  / 90 days whichever is less): 6 weeks         Visit # Insurance Allowable Auth Required   4 30 []  Yes [x]  No        Functional Scale: UEFI 22% LOF    Date assessed:       Latex Allergy:  [x]NO      []YES  Preferred Language for Healthcare:   [x]English       []other:    Pain level:  0/10  6/     SUBJECTIVE:  / Pt reports overall he feels his motion is improving especially with supination/ pronation and pain is manageable. Only has occasional discomfort with activities such as scratching his head. Has not really tested or pushed his arm with weights.        OBJECTIVE: See eval   Observation:    Test measurements:      RESTRICTIONS/PRECAUTIONS: NWB    Exercises/Interventions:   Exercises:  Exercise/Equipment Resistance/Repetitions Other comments   Stretching/PROM     UBE x5' Added 6/2   Wrist flexor stretch 5x30\"    Wrist extensor stretch 5x30\"    Elbow flexion with OP 6/2 withheld d/t improvement in ROM   Elbow extension with OP 6/2 withheld d/t improvement in ROM   Sup/pro with OP 6/2 withheld d/t improvement in ROM         Isometrics     Retraction      3' ^6/2 Blue gripper   Weight shift     Flexion     Abduction     External Rotation     Internal Rotation     Biceps     Triceps          PRE's     Flexion     Abduction     External Rotation     Internal Rotation     Shrugs     EXT     Reverse Flys     Serratus     Horizontal Abd with ER     Biceps 4lbs 3x10 ^6/2   Triceps     Wrist ext 3lbs 3x10 ^6/2   Wrist flex 3lbs 3x10 ^6/2   Sup/pro 3lbs 3x10 ^6/2   Rubber band finger extension 3x10 Added 5/25   therabar flex/ext Red 3x10 each Added 5/28   therabar sup/pro Green 3x10 each ^6/2        Cable Column/Theraband     External Rotation     Internal Rotation     Shrugs     Lats     Ext Blue TB 3x10 bilat Added 6/2    Flex     Scapular Retraction Blue TB 3x10 bilat Added 6/2, cues provided for proper scapular activation    BIC     TRIC Black 3x10 ^6/2   PNF          Dynamic Stability          Plyoback          Manual interventions                     Therapeutic Exercise and NMR EXR  [x] (35730) Provided verbal/tactile cueing for activities related to strengthening, flexibility, endurance, ROM  for improvements in scapular, scapulothoracic and UE control with self care, reaching, carrying, lifting, house/yardwork, driving/computer work.    [] (89740) Provided verbal/tactile cueing for activities related to improving balance, coordination, kinesthetic sense, posture, motor skill, proprioception  to assist with  scapular, scapulothoracic and UE control with self care, reaching, carrying, lifting, house/yardwork, driving/computer work.     Therapeutic Activities:    [] (18084 or 20816) Provided verbal/tactile cueing for activities related to improving balance, coordination, kinesthetic sense, posture, motor skill, proprioception and motor activation to allow for proper function of scapular, scapulothoracic and UE control with self care, carrying, lifting, driving/computer work. Home Exercise Program:    [x] (38209) Reviewed/Progressed HEP activities related to strengthening, flexibility, endurance, ROM of scapular, scapulothoracic and UE control with self care, reaching, carrying, lifting, house/yardwork, driving/computer work  [] (20892) Reviewed/Progressed HEP activities related to improving balance, coordination, kinesthetic sense, posture, motor skill, proprioception of scapular, scapulothoracic and UE control with self care, reaching, carrying, lifting, house/yardwork, driving/computer work      Manual Treatments:  PROM / STM / Oscillations-Mobs:  G-I, II, III, IV (PA's, Inf., Post.)  [] (97812) Provided manual therapy to mobilize soft tissue/joints of cervical/CT, scapular GHJ and UE for the purpose of modulating pain, promoting relaxation,  increasing ROM, reducing/eliminating soft tissue swelling/inflammation/restriction, improving soft tissue extensibility and allowing for proper ROM for normal function with self care, reaching, carrying, lifting, house/yardwork, driving/computer work    Modalities:  Ice 15'    Charges:  Timed Code Treatment Minutes: 39'   Total Treatment Minutes: 4:38-5:32  54'       [] EVAL (LOW) 53156 (typically 20 minutes face-to-face)  [] EVAL (MOD) 01023 (typically 30 minutes face-to-face)  [] EVAL (HIGH) 39971 (typically 45 minutes face-to-face)  [] RE-EVAL     [x] YV(68919) x 3    [] IONTO  [] NMR (97066) x     [] VASO  [] Manual (97793) x      [] Other:  [] TA x      [] Mech Traction (27332)  [] ES(attended) (16069)      [] ES (un) (75571):     GOALS:    Patient stated goal: Able to sleep throughout the night without pain/dysfunction. []? Progressing: []? Met: []? Not Met: []? Adjusted     Therapist goals for Patient:   Short Term Goals: To be achieved in: 2 weeks  1.  Independent in HEP and progression per patient tolerance, in order to prevent re-injury. []? Progressing: []? Met: []? Not Met: []? Adjusted   2. Patient will have a decrease in pain to facilitate improvement in movement, function, and ADLs as indicated by Functional Deficits. []? Progressing: []? Met: []? Not Met: []? Adjusted     Long Term Goals: To be achieved in: 6 weeks  1. Disability index score of 10% or less for the UEFS to assist with reaching prior level of function. []? Progressing: []? Met: []? Not Met: []? Adjusted  2. Patient will demonstrate increased AROM to WNL to allow for proper joint functioning as indicated by patients Functional Deficits. []? Progressing: []? Met: []? Not Met: []? Adjusted  3. Patient will demonstrate an increase in strength to good scapular and core control  for UE to allow for proper functional mobility as indicated by patients Functional Deficits. []? Progressing: []? Met: []? Not Met: []? Adjusted  4. Patient will return to all functional activities without increased symptoms or restriction. []? Progressing: []? Met: []? Not Met: []? Adjusted  5. Patient will be able to lift 10lbs in his left hand without pain/dysfunction. []? Progressing: []? Met: []? Not Met: []? Adjusted         Overall Progression Towards Functional goals/ Treatment Progress Update:  [] Patient is progressing as expected towards functional goals listed. [] Progression is slowed due to complexities/Impairments listed. [] Progression has been slowed due to co-morbidities.   [x] Plan just implemented, too soon to assess goals progression <30days   [] Goals require adjustment due to lack of progress  [] Patient is not progressing as expected and requires additional follow up with physician  [] Other    Prognosis for POC: [x] Good [] Fair  [] Poor      Patient requires continued skilled intervention: [x] Yes  [] No    Treatment/Activity Tolerance:  [x] Patient able to complete treatment  [] Patient limited by fatigue  [] Patient limited by pain     [] Patient limited by other medical complications  [x] Other: 6/2 Pt L elbow AROM appears to be improving and demonstrated range WNL at start of treatment. Responded well to increase in intensity of strengthening exercises with no adverse effects. Patient Education:      Access Code: [email or web address]  URL: [email or web address]  Date: 05/21/2021  Prepared by: Ras Schafer     Exercises  Supported Elbow Flexion Extension PROM - 2 x daily - 7 x weekly - 1 sets - 10 reps - 10 hold  Elbow Extension PROM - 2 x daily - 7 x weekly - 1 sets - 10 reps - 10 hold  Seated Wrist Supination Stretch - 2 x daily - 7 x weekly - 1 sets - 10 reps - 10 hold  Wrist Pronation Stretch - 2 x daily - 7 x weekly - 1 sets - 10 reps - 10 hold  Seated Wrist Extension Stretch - 2 x daily - 7 x weekly - 1 sets - 5 reps - 30 hold  Seated Wrist Flexion Stretch - 2 x daily - 7 x weekly - 1 sets - 5 reps - 30 hold  Ice - 2-3 x daily - 7 x weekly - 15 minutes hold                PLAN:   [x] Continue per plan of care [] Alter current plan (see comments above)  [] Plan of care initiated [] Hold pending MD visit [] Discharge    Progress per restrictions. Electronically signed by:  Leila Lomeli PT    Note: If patient does not return for scheduled/ recommended follow up visits, this note will serve as a discharge from care along with most recent update on progress.

## 2021-06-04 ENCOUNTER — HOSPITAL ENCOUNTER (OUTPATIENT)
Dept: PHYSICAL THERAPY | Age: 31
Setting detail: THERAPIES SERIES
Discharge: HOME | End: 2021-06-04
Payer: COMMERCIAL

## 2021-06-04 PROCEDURE — 97110 THERAPEUTIC EXERCISES: CPT

## 2021-06-04 NOTE — FLOWSHEET NOTE
improvement in ROM         Isometrics     Retraction      3' ^6/4 Black gripper   Weight shift     Flexion     Abduction     External Rotation     Internal Rotation     Biceps     Triceps          PRE's     Flexion     Abduction     External Rotation     Internal Rotation     Shrugs     EXT     Reverse Flys     Serratus Push up + 3x10 bilat Added 6/4 completed at wall    Horizontal Abd with ER     Biceps 5lbs 3x10 ^6/4   Triceps     Wrist ext 4lbs 3x10 ^6/4   Wrist flex 4lbs 3x10 ^6/4   Sup/pro 3lbs 3x10 ^6/2   Rubber band finger extension 3x10 Added 5/25   therabar flex/ext Red 3x10 each Added 5/28   therabar sup/pro Green 3x10 each ^6/2        Cable Column/Theraband     External Rotation     Internal Rotation     Shrugs     Lats     Ext Black TB 3x10 bilat ^6/4   Flex     Scapular Retraction Black TB 3x10 bilat ^6/4   BIC     TRIC Doristine Mais 3x10 ^6/4   PNF          Dynamic Stability     Ball on wall  Vertical 30\"hx3 L  Horizontal 30\"hx3 L Added 6/4   Plyoback          Manual interventions                     Therapeutic Exercise and NMR EXR  [x] (30575) Provided verbal/tactile cueing for activities related to strengthening, flexibility, endurance, ROM  for improvements in scapular, scapulothoracic and UE control with self care, reaching, carrying, lifting, house/yardwork, driving/computer work.    [] (96915) Provided verbal/tactile cueing for activities related to improving balance, coordination, kinesthetic sense, posture, motor skill, proprioception  to assist with  scapular, scapulothoracic and UE control with self care, reaching, carrying, lifting, house/yardwork, driving/computer work.     Therapeutic Activities:    [] (69404 or 63775) Provided verbal/tactile cueing for activities related to improving balance, coordination, kinesthetic sense, posture, motor skill, proprioception and motor activation to allow for proper function of scapular, scapulothoracic and UE control with self care, carrying, lifting, will have a decrease in pain to facilitate improvement in movement, function, and ADLs as indicated by Functional Deficits. []? Progressing: []? Met: []? Not Met: []? Adjusted     Long Term Goals: To be achieved in: 6 weeks  1. Disability index score of 10% or less for the UEFS to assist with reaching prior level of function. []? Progressing: []? Met: []? Not Met: []? Adjusted  2. Patient will demonstrate increased AROM to WNL to allow for proper joint functioning as indicated by patients Functional Deficits. []? Progressing: []? Met: []? Not Met: []? Adjusted  3. Patient will demonstrate an increase in strength to good scapular and core control  for UE to allow for proper functional mobility as indicated by patients Functional Deficits. []? Progressing: []? Met: []? Not Met: []? Adjusted  4. Patient will return to all functional activities without increased symptoms or restriction. []? Progressing: []? Met: []? Not Met: []? Adjusted  5. Patient will be able to lift 10lbs in his left hand without pain/dysfunction. []? Progressing: []? Met: []? Not Met: []? Adjusted         Overall Progression Towards Functional goals/ Treatment Progress Update:  [] Patient is progressing as expected towards functional goals listed. [] Progression is slowed due to complexities/Impairments listed. [] Progression has been slowed due to co-morbidities.   [x] Plan just implemented, too soon to assess goals progression <30days   [] Goals require adjustment due to lack of progress  [] Patient is not progressing as expected and requires additional follow up with physician  [] Other    Prognosis for POC: [x] Good [] Fair  [] Poor      Patient requires continued skilled intervention: [x] Yes  [] No    Treatment/Activity Tolerance:  [x] Patient able to complete treatment  [] Patient limited by fatigue  [] Patient limited by pain     [] Patient limited by other medical complications  [x] Other: 6/4 Pt continues to respond well to treatment with no adverse effects. Responded well to light weight bearing exercises including ball on wall and push up + with no symptoms. Patient Education:      Access Code: [email or web address]  URL: [email or web address]  Date: 05/21/2021  Prepared by: Laure Sandifer     Exercises  Supported Elbow Flexion Extension PROM - 2 x daily - 7 x weekly - 1 sets - 10 reps - 10 hold  Elbow Extension PROM - 2 x daily - 7 x weekly - 1 sets - 10 reps - 10 hold  Seated Wrist Supination Stretch - 2 x daily - 7 x weekly - 1 sets - 10 reps - 10 hold  Wrist Pronation Stretch - 2 x daily - 7 x weekly - 1 sets - 10 reps - 10 hold  Seated Wrist Extension Stretch - 2 x daily - 7 x weekly - 1 sets - 5 reps - 30 hold  Seated Wrist Flexion Stretch - 2 x daily - 7 x weekly - 1 sets - 5 reps - 30 hold  Ice - 2-3 x daily - 7 x weekly - 15 minutes hold                PLAN:   [x] Continue per plan of care [] Alter current plan (see comments above)  [] Plan of care initiated [] Hold pending MD visit [] Discharge    Progress per restrictions. Electronically signed by:  Rj Loyd PT    Note: If patient does not return for scheduled/ recommended follow up visits, this note will serve as a discharge from care along with most recent update on progress.

## 2021-06-08 ENCOUNTER — HOSPITAL ENCOUNTER (OUTPATIENT)
Dept: PHYSICAL THERAPY | Age: 31
Setting detail: THERAPIES SERIES
Discharge: HOME | End: 2021-06-08
Payer: COMMERCIAL

## 2021-06-08 PROCEDURE — 97110 THERAPEUTIC EXERCISES: CPT | Performed by: PHYSICAL THERAPIST

## 2021-06-08 NOTE — FLOWSHEET NOTE
improvement in ROM         Isometrics     Retraction      3' ^6/4 Black gripper   Weight shift     Flexion     Abduction     External Rotation     Internal Rotation     Biceps     Triceps          PRE's     Flexion     Abduction     External Rotation     Internal Rotation     Shrugs     EXT 4lbs 3x10 Added 6/8   Reverse Flys     Serratus 4lbs 3x10     Horizontal Abd with ER     Biceps 6lbs 3x10 ^6/8   Triceps 3lbs 3x10 Added 6/8   Wrist ext 4lbs 3x10 ^6/4   Wrist flex 4lbs 3x10 ^6/4   Sup/pro 4lbs 3x10 ^6/8   Rubber band finger extension 3x10 Added 5/25   therabar flex/ext Green 3x10 each Increased  6/8   therabar sup/pro Green 3x10 each ^6/2        Cable Column/Theraband     External Rotation     Internal Rotation     Shrugs     Lats     Ext Black TB 3x10 bilat ^6/4   Flex     Scapular Retraction Silver TB 3x10 bilat ^6/8   BIC     ^6/4   PNF          Dynamic Stability     Ball on wall   Added 6/4   Plyoback          Manual interventions                     Therapeutic Exercise and NMR EXR  [x] (52870) Provided verbal/tactile cueing for activities related to strengthening, flexibility, endurance, ROM  for improvements in scapular, scapulothoracic and UE control with self care, reaching, carrying, lifting, house/yardwork, driving/computer work.    [] (86573) Provided verbal/tactile cueing for activities related to improving balance, coordination, kinesthetic sense, posture, motor skill, proprioception  to assist with  scapular, scapulothoracic and UE control with self care, reaching, carrying, lifting, house/yardwork, driving/computer work. Therapeutic Activities:    [] (58262 or 98708) Provided verbal/tactile cueing for activities related to improving balance, coordination, kinesthetic sense, posture, motor skill, proprioception and motor activation to allow for proper function of scapular, scapulothoracic and UE control with self care, carrying, lifting, driving/computer work.      Home Exercise Program: [x] (51449) Reviewed/Progressed HEP activities related to strengthening, flexibility, endurance, ROM of scapular, scapulothoracic and UE control with self care, reaching, carrying, lifting, house/yardwork, driving/computer work  [] (74245) Reviewed/Progressed HEP activities related to improving balance, coordination, kinesthetic sense, posture, motor skill, proprioception of scapular, scapulothoracic and UE control with self care, reaching, carrying, lifting, house/yardwork, driving/computer work      Manual Treatments:  PROM / STM / Oscillations-Mobs:  G-I, II, III, IV (PA's, Inf., Post.)  [] (63858) Provided manual therapy to mobilize soft tissue/joints of cervical/CT, scapular GHJ and UE for the purpose of modulating pain, promoting relaxation,  increasing ROM, reducing/eliminating soft tissue swelling/inflammation/restriction, improving soft tissue extensibility and allowing for proper ROM for normal function with self care, reaching, carrying, lifting, house/yardwork, driving/computer work    Modalities:  Ice 15'    Charges:  Timed Code Treatment Minutes: 40'   Total Treatment Minutes: 1:45-2:40  55'       [] EVAL (LOW) 09651 (typically 20 minutes face-to-face)  [] EVAL (MOD) 14087 (typically 30 minutes face-to-face)  [] EVAL (HIGH) 65726 (typically 45 minutes face-to-face)  [] RE-EVAL     [x] JR(78873) x 3    [] IONTO  [] NMR (81418) x     [] VASO  [] Manual (86965) x      [] Other:  [] TA x      [] Mech Traction (83063)  [] ES(attended) (17140)      [] ES (un) (83041):     GOALS:    Patient stated goal: Able to sleep throughout the night without pain/dysfunction. []? Progressing: []? Met: []? Not Met: []? Adjusted     Therapist goals for Patient:   Short Term Goals: To be achieved in: 2 weeks  1. Independent in HEP and progression per patient tolerance, in order to prevent re-injury. []? Progressing: []? Met: []? Not Met: []? Adjusted   2.  Patient will have a decrease in pain to facilitate Able to progress resistance program today without problem. Requires continued focus on flexibility and strength. Patient Education:      Access Code: [email or web address]  URL: [email or web address]  Date: 05/21/2021  Prepared by: Jaron Tyson     Exercises  Supported Elbow Flexion Extension PROM - 2 x daily - 7 x weekly - 1 sets - 10 reps - 10 hold  Elbow Extension PROM - 2 x daily - 7 x weekly - 1 sets - 10 reps - 10 hold  Seated Wrist Supination Stretch - 2 x daily - 7 x weekly - 1 sets - 10 reps - 10 hold  Wrist Pronation Stretch - 2 x daily - 7 x weekly - 1 sets - 10 reps - 10 hold  Seated Wrist Extension Stretch - 2 x daily - 7 x weekly - 1 sets - 5 reps - 30 hold  Seated Wrist Flexion Stretch - 2 x daily - 7 x weekly - 1 sets - 5 reps - 30 hold  Ice - 2-3 x daily - 7 x weekly - 15 minutes hold                PLAN:   [x] Continue per plan of care [] Alter current plan (see comments above)  [] Plan of care initiated [] Hold pending MD visit [] Discharge    Progress per restrictions. Electronically signed by:  Jos Gusman PT    Note: If patient does not return for scheduled/ recommended follow up visits, this note will serve as a discharge from care along with most recent update on progress.

## 2021-06-11 ENCOUNTER — HOSPITAL ENCOUNTER (OUTPATIENT)
Dept: PHYSICAL THERAPY | Age: 31
Setting detail: THERAPIES SERIES
Discharge: HOME | End: 2021-06-11
Payer: COMMERCIAL

## 2021-06-11 PROCEDURE — 97110 THERAPEUTIC EXERCISES: CPT | Performed by: PHYSICAL THERAPIST

## 2021-06-11 NOTE — FLOWSHEET NOTE
The [address] [place] [address],[address], [address], [address] [place] [address]  Phone: [phone]   Fax:     [phone]    Physical Therapy Daily Treatment Note  Date:  2021    Patient Name:  Michael eBllamy    :  1990  MRN: [id number]  Restrictions/Precautions:    Medical/Treatment Diagnosis Information:  · Diagnosis: S52.125D (ICD-10-CM) - Closed nondisplaced fracture of head of left radius with routine healing, subsequent encounter  · Treatment Diagnosis: M25. 522 pain in left elbow  Insurance/Certification information:  [facility]/no copay/ no auth  Physician Information:  Referring Practitioner: Rupal Askew  Has the plan of care been signed (Y/N):        []  Yes  [x]  No     Date of Patient follow up with Physician:       Is this a Progress Report:     []  Yes  [x]  No        If Yes:  Date Range for reporting period:  Beginning  Ending    Progress report will be due (10 Rx or 30 days whichever is less):        Recertification will be due (POC Duration  / 90 days whichever is less): 6 weeks         Visit # Insurance Allowable Auth Required   7 30 []  Yes [x]  No        Functional Scale: UEFI 22% LOF    Date assessed:       Latex Allergy:  [x]NO      []YES  Preferred Language for Healthcare:   [x]English       []other:    Pain level:  0/10  6/4     SUBJECTIVE:   Pt reports that his elbow is doing well. Notes that he still feels some popping/discomfort with certain movements. Otherwise, he is doing very well.        OBJECTIVE: See eval   Observation:    Test measurements:      RESTRICTIONS/PRECAUTIONS: NWB    Exercises/Interventions:   Exercises:  Exercise/Equipment Resistance/Repetitions Other comments   Stretching/PROM     UBE x5' Added 6/2   Wrist flexor stretch 5x30\"    Wrist extensor stretch 5x30\"    Elbow flexion with OP 6/2 withheld d/t improvement in ROM   Elbow extension with OP 6/2 withheld d/t improvement in ROM   Sup/pro with OP 6/2 withheld d/t improvement in ROM         Isometrics     Retraction      3' ^6/4 Black gripper   Weight shift     Flexion     Abduction     External Rotation     Internal Rotation     Biceps     Triceps          PRE's     Flexion     Abduction     External Rotation     Internal Rotation     Shrugs     EXT 6lbs 3x10 Increased 6/11   Reverse Flys     Serratus 6lbs 3x10 Increased 6/11    Horizontal Abd with ER     Biceps 6lbs 3x10 ^6/8   Triceps 4lbs 3x10 Increased 6/11   Wrist ext 5lbs 3x10 ^6/11   Wrist flex 5lbs 3x10 ^6/11   Sup/pro 5lbs 3x10 ^6/11   Rubber band finger extension 3x10 Added 5/25   therabar flex/ext Green 3x10 each Increased  6/8   therabar sup/pro Green 3x10 each ^6/2        Cable Column/Theraband     External Rotation     Internal Rotation     Shrugs     Lats     Ext Silver TB 3x10 bilat ^6/11   Flex     Scapular Retraction Silver TB 3x10 bilat ^6/8   BIC     ^6/4   PNF          Dynamic Stability     Ball on wall   Added 6/4   [employer]          Manual interventions                     Therapeutic Exercise and NMR EXR  [x] (62963) Provided verbal/tactile cueing for activities related to strengthening, flexibility, endurance, ROM  for improvements in scapular, scapulothoracic and UE control with self care, reaching, carrying, lifting, house/yardwork, driving/computer work.    [] (93042) Provided verbal/tactile cueing for activities related to improving balance, coordination, kinesthetic sense, posture, motor skill, proprioception  to assist with  scapular, scapulothoracic and UE control with self care, reaching, carrying, lifting, house/yardwork, driving/computer work.     Therapeutic Activities:    [] (42039 or 43356) Provided verbal/tactile cueing for activities related to improving balance, coordination, kinesthetic sense, posture, motor skill, proprioception and motor activation to allow for proper function of scapular, scapulothoracic and UE control with self care, carrying, lifting, driving/computer work. Home Exercise Program:    [x] (31156) Reviewed/Progressed HEP activities related to strengthening, flexibility, endurance, ROM of scapular, scapulothoracic and UE control with self care, reaching, carrying, lifting, house/yardwork, driving/computer work  [] (86915) Reviewed/Progressed HEP activities related to improving balance, coordination, kinesthetic sense, posture, motor skill, proprioception of scapular, scapulothoracic and UE control with self care, reaching, carrying, lifting, house/yardwork, driving/computer work      Manual Treatments:  PROM / STM / Oscillations-Mobs:  G-I, II, III, IV (PA's, Inf., Post.)  [] (58955) Provided manual therapy to mobilize soft tissue/joints of cervical/CT, scapular GHJ and UE for the purpose of modulating pain, promoting relaxation,  increasing ROM, reducing/eliminating soft tissue swelling/inflammation/restriction, improving soft tissue extensibility and allowing for proper ROM for normal function with self care, reaching, carrying, lifting, house/yardwork, driving/computer work    Modalities:  Ice 15'    Charges:  Timed Code Treatment Minutes: 40'   Total Treatment Minutes: 12:25-1:20  55'       [] EVAL (LOW) 26570 (typically 20 minutes face-to-face)  [] EVAL (MOD) 23720 (typically 30 minutes face-to-face)  [] EVAL (HIGH) 15667 (typically 45 minutes face-to-face)  [] RE-EVAL     [x] TZ(05589) x 3    [] IONTO  [] NMR (00776) x     [] VASO  [] Manual (40385) x      [] Other:  [] TA x      [] Mech Traction (81624)  [] ES(attended) (31280)      [] ES (un) (68188):     GOALS:    Patient stated goal: Able to sleep throughout the night without pain/dysfunction. []? Progressing: []? Met: []? Not Met: []? Adjusted     Therapist goals for Patient:   Short Term Goals: To be achieved in: 2 weeks  1. Independent in HEP and progression per patient tolerance, in order to prevent re-injury. []?  Progressing: []? Met: []? Not Met: []? Adjusted   2. Patient will have a decrease in pain to facilitate improvement in movement, function, and ADLs as indicated by Functional Deficits. []? Progressing: []? Met: []? Not Met: []? Adjusted     Long Term Goals: To be achieved in: 6 weeks  1. Disability index score of 10% or less for the UEFS to assist with reaching prior level of function. []? Progressing: []? Met: []? Not Met: []? Adjusted  2. Patient will demonstrate increased AROM to WNL to allow for proper joint functioning as indicated by patients Functional Deficits. []? Progressing: []? Met: []? Not Met: []? Adjusted  3. Patient will demonstrate an increase in strength to good scapular and core control  for UE to allow for proper functional mobility as indicated by patients Functional Deficits. []? Progressing: []? Met: []? Not Met: []? Adjusted  4. Patient will return to all functional activities without increased symptoms or restriction. []? Progressing: []? Met: []? Not Met: []? Adjusted  5. Patient will be able to lift 10lbs in his left hand without pain/dysfunction. []? Progressing: []? Met: []? Not Met: []? Adjusted         Overall Progression Towards Functional goals/ Treatment Progress Update:  [] Patient is progressing as expected towards functional goals listed. [] Progression is slowed due to complexities/Impairments listed. [] Progression has been slowed due to co-morbidities.   [x] Plan just implemented, too soon to assess goals progression <30days   [] Goals require adjustment due to lack of progress  [] Patient is not progressing as expected and requires additional follow up with physician  [] Other    Prognosis for POC: [x] Good [] Fair  [] Poor      Patient requires continued skilled intervention: [x] Yes  [] No    Treatment/Activity Tolerance:  [x] Patient able to complete treatment  [] Patient limited by fatigue  [] Patient limited by pain     [] Patient limited by other medical complications  [x] Other: 6/11 No complaints with today's program.  ROM appeared to be normal today. Able to progress resistance program today without problem. Requires continued focus on flexibility and strength. Patient Education:      Access Code: [email or web address]  URL: [email or web address]  Date: 05/21/2021  Prepared by: Jack Her     Exercises  Supported Elbow Flexion Extension PROM - 2 x daily - 7 x weekly - 1 sets - 10 reps - 10 hold  Elbow Extension PROM - 2 x daily - 7 x weekly - 1 sets - 10 reps - 10 hold  Seated Wrist Supination Stretch - 2 x daily - 7 x weekly - 1 sets - 10 reps - 10 hold  Wrist Pronation Stretch - 2 x daily - 7 x weekly - 1 sets - 10 reps - 10 hold  Seated Wrist Extension Stretch - 2 x daily - 7 x weekly - 1 sets - 5 reps - 30 hold  Seated Wrist Flexion Stretch - 2 x daily - 7 x weekly - 1 sets - 5 reps - 30 hold  Ice - 2-3 x daily - 7 x weekly - 15 minutes hold                PLAN:   [x] Continue per plan of care [] Alter current plan (see comments above)  [] Plan of care initiated [] Hold pending MD visit [] Discharge    Progress per restrictions. Electronically signed by:  Michael Cooper, PT    Note: If patient does not return for scheduled/ recommended follow up visits, this note will serve as a discharge from care along with most recent update on progress.

## 2021-06-15 ENCOUNTER — OFFICE VISIT (OUTPATIENT)
Dept: ORTHOPEDIC SURGERY | Age: 31
End: 2021-06-15
Payer: COMMERCIAL

## 2021-06-15 VITALS — BODY MASS INDEX: 24.44 KG/M2 | WEIGHT: 165 LBS | HEIGHT: 69 IN | TEMPERATURE: 97 F

## 2021-06-15 DIAGNOSIS — M25.522 LEFT ELBOW PAIN: Primary | ICD-10-CM

## 2021-06-15 DIAGNOSIS — S52.125D CLOSED NONDISPLACED FRACTURE OF HEAD OF LEFT RADIUS WITH ROUTINE HEALING, SUBSEQUENT ENCOUNTER: ICD-10-CM

## 2021-06-15 PROCEDURE — 99214 OFFICE O/P EST MOD 30 MIN: CPT | Performed by: ORTHOPAEDIC SURGERY

## 2021-06-15 NOTE — PROGRESS NOTES
Chief Complaint  Follow-up (left elbow )      History of Present Illness:  Love Lundberg is a pleasant 26 y.o. male who is here today for follow up evaluation of their left elbow. He sustained a Gurjit type I radial head fracture 4/27 after falling directly on the elbow while skateboarding. He has been treated nonoperatively. He has been working with formal, supervised physical therapy on range of motion and strengthening exercises. He feels that he is making progress. States he is only having minimal pain. No new injuries reported. Medical History:  Patient's medications, allergies, past medical, surgical, social and family histories were reviewed and updated as appropriate. Pertinent items are noted in HPI  Review of systems reviewed from Patient History Form completed today and available in the patient's chart under the Media tab. Pain Assessment  Location of Pain: Elbow  Location Modifiers: Left  Severity of Pain: 1  Quality of Pain:  (mild)  Duration of Pain: A few minutes  Frequency of Pain: Intermittent  Aggravating Factors:  (n/a)  Limiting Behavior: Some  Relieving Factors: Rest  Result of Injury: Yes  Work-Related Injury: No  Are there other pain locations you wish to document?: No    History reviewed. No pertinent past medical history. History reviewed. No pertinent surgical history. History reviewed. No pertinent family history.     Social History     Socioeconomic History    Marital status: Unknown     Spouse name: None    Number of children: None    Years of education: None    Highest education level: None   Occupational History    None   Tobacco Use    Smoking status: Never Smoker    Smokeless tobacco: Never Used   Substance and Sexual Activity    Alcohol use: Never    Drug use: Never    Sexual activity: None   Other Topics Concern    None   Social History Narrative    None     Social Determinants of Health     Financial Resource Strain:     Difficulty of Paying Living Expenses:    Food Insecurity:     Worried About Running Out of Food in the Last Year:     [address] in the Last Year:    Transportation Needs:     Lack of Transportation (Medical):  Lack of Transportation (Non-Medical):    Physical Activity:     Days of Exercise per Week:     Minutes of Exercise per Session:    Stress:     Feeling of Stress :    Social Connections:     Frequency of Communication with Friends and Family:     Frequency of Social Gatherings with Friends and Family:     Attends [employer] Services:     Active Member of Clubs or Organizations:     Attends Club or Organization Meetings:     Marital Status:    Intimate Partner Violence:     Fear of Current or Ex-Partner:     Emotionally Abused:     Physically Abused:     Sexually Abused:        Current Outpatient Medications   Medication Sig Dispense Refill    ketorolac (TORADOL) 10 MG tablet        No current facility-administered medications for this visit. No Known Allergies    Vital signs:  Temp 97 °F (36.1 °C)   Ht 5' 9\" (1.753 m)   Wt 165 lb (74.8 kg)   BMI 24.37 kg/m²             LEFT Elbow Examination:    Inspection:    Normal alignment. No swelling. Palpation:     Mild radial head tenderness. Range of Motion:      0-135 degrees. Strength:       5/5 in all muscle groups. Instability testing:  Stable to varus and valgus stress. Vascular exam:    Skin warm and well-perfused. Neurologic exam:     Sensation intact to light touch. RIGHT Elbow Comparison Examination:    Inspection:    Normal alignment. No swelling. Palpation:     No tenderness to palpation. Range of Motion:      0-135 degrees. Strength:       5/5 in all muscle groups. Instability testing:  Stable to varus and valgus stress. Vascular exam:    Skin warm and well-perfused. Neurologic exam:     Sensation intact to light touch.           Radiology:     Pertinent imaging was interpreted and reviewed with the patient. LEFT elbow x-ray:    AP, lateral and oblique views were obtained  and reviewed of the left elbow. Impression: Healing radial head fracture in good position         Assessment :  77-year-old right-hand-dominant male with left nondisplaced radial head fracture     Impression:  Encounter Diagnoses   Name Primary?  Left elbow pain Yes    Closed nondisplaced fracture of head of left radius with routine healing, subsequent encounter        Office Procedures:  Orders Placed This Encounter   Procedures    XR ELBOW LEFT (MIN 3 VIEWS)     Standing Status:   Future     Number of Occurrences:   1     Standing Expiration Date:   6/15/2022     Order Specific Question:   Reason for exam:     Answer:   pain         Plan: Pertinent imaging was reviewed. The etiology, natural history, and treatment options for the disorder were discussed. The roles of activity medication, antiinflammatories, injections, bracing, physical therapy, and surgical interventions were all described to the patient and questions were answered. He is doing well at this time. Fracture is healing and motion has improved. Continue range of motion and strengthening exercises on his own. He is able to return to activity using pain as his guide, avoiding pushing and impact activities for another month due to some irritation. I will see him back if symptoms persist or worsen. Kelly Trejo is in agreement with this plan. All questions were answered to patient's satisfaction and was encouraged to call with any further questions. Total time spent for evaluation, education and development of treatment plan: 37 minutes        Fang BALDWIN ATC, am scribing for and in the presence of Dr. Tone Grant.    06/15/21 5:12 PM Fang Kimball ATC      I attest that I met personally with the patient, performed the described exam, reviewed the radiographic studies and medical records associated with this patient and supervised the services that are described above.      Jerrica Germain MD

## 2021-06-16 ENCOUNTER — HOSPITAL ENCOUNTER (OUTPATIENT)
Dept: PHYSICAL THERAPY | Age: 31
Setting detail: THERAPIES SERIES
Discharge: HOME | End: 2021-06-16
Payer: COMMERCIAL

## 2021-06-16 PROCEDURE — 97110 THERAPEUTIC EXERCISES: CPT | Performed by: PHYSICAL THERAPIST

## 2021-06-16 NOTE — FLOWSHEET NOTE
The [facility], [address] [place] [address], [address] [place] [address]  Phone: [phone]   Fax:     [phone]    Physical Therapy Daily Treatment Note  Date:  2021    Patient Name:  Jaida Patel    :  1990  MRN: [id number]  Restrictions/Precautions:    Medical/Treatment Diagnosis Information:  · Diagnosis: S52.125D (ICD-10-CM) - Closed nondisplaced fracture of head of left radius with routine healing, subsequent encounter  · Treatment Diagnosis: M25. 522 pain in left elbow  Insurance/Certification information:  PT Insurance Information: [facility]/30 vpcy/no copay/ no auth  Physician Information:  Referring Practitioner: Ayana Burrell  Has the plan of care been signed (Y/N):        []  Yes  [x]  No     Date of Patient follow up with Physician:       Is this a Progress Report:     []  Yes  [x]  No        If Yes:  Date Range for reporting period:  Beginning  Ending    Progress report will be due (10 Rx or 30 days whichever is less):        Recertification will be due (POC Duration  / 90 days whichever is less): 6 weeks         Visit # Insurance Allowable Auth Required   8 30 []  Yes [x]  No        Functional Scale: UEFI 22% LOF    Date assessed:       Latex Allergy:  [x]NO      []YES  Preferred Language for Healthcare:   [x]English       []other:    Pain level:  0/10  6/4     SUBJECTIVE:   Pt reports that his elbow is doing well. Notes that he still feels some popping/discomfort with certain movements. Otherwise, he is doing very well.        OBJECTIVE: See eval   Observation:    Test measurements:      RESTRICTIONS/PRECAUTIONS: NWB    Exercises/Interventions:   Exercises:  Exercise/Equipment Resistance/Repetitions Other comments   Stretching/PROM     UBE x5' Added /2   Wrist flexor stretch 5x30\"    Wrist extensor stretch 5x30\"    Elbow flexion with OP / withheld d/t improvement in ROM   Elbow extension with OP proprioception and motor activation to allow for proper function of scapular, scapulothoracic and UE control with self care, carrying, lifting, driving/computer work. Home Exercise Program:    [x] (91833) Reviewed/Progressed HEP activities related to strengthening, flexibility, endurance, ROM of scapular, scapulothoracic and UE control with self care, reaching, carrying, lifting, house/yardwork, driving/computer work  [] (77342) Reviewed/Progressed HEP activities related to improving balance, coordination, kinesthetic sense, posture, motor skill, proprioception of scapular, scapulothoracic and UE control with self care, reaching, carrying, lifting, house/yardwork, driving/computer work      Manual Treatments:  PROM / STM / Oscillations-Mobs:  G-I, II, III, IV (PA's, Inf., Post.)  [] (49478) Provided manual therapy to mobilize soft tissue/joints of cervical/CT, scapular GHJ and UE for the purpose of modulating pain, promoting relaxation,  increasing ROM, reducing/eliminating soft tissue swelling/inflammation/restriction, improving soft tissue extensibility and allowing for proper ROM for normal function with self care, reaching, carrying, lifting, house/yardwork, driving/computer work    Modalities:  Ice 15'    Charges:  Timed Code Treatment Minutes: 39'   Total Treatment Minutes: 11:30-12:27  62'       [] EVAL (LOW) 24788 (typically 20 minutes face-to-face)  [] EVAL (MOD) 92516 (typically 30 minutes face-to-face)  [] EVAL (HIGH) 83786 (typically 45 minutes face-to-face)  [] RE-EVAL     [x] SA(26174) x 3    [] IONTO  [] NMR (05630) x     [] VASO  [] Manual (31561) x      [] Other:  [] TA x      [] Mech Traction (56249)  [] ES(attended) (36442)      [] ES (un) (71576):     GOALS:    Patient stated goal: Able to sleep throughout the night without pain/dysfunction. []? Progressing: []? Met: []? Not Met: []? Adjusted     Therapist goals for Patient:   Short Term Goals: To be achieved in: 2 weeks  1.  Independent in HEP and progression per patient tolerance, in order to prevent re-injury. []? Progressing: []? Met: []? Not Met: []? Adjusted   2. Patient will have a decrease in pain to facilitate improvement in movement, function, and ADLs as indicated by Functional Deficits. []? Progressing: []? Met: []? Not Met: []? Adjusted     Long Term Goals: To be achieved in: 6 weeks  1. Disability index score of 10% or less for the UEFS to assist with reaching prior level of function. []? Progressing: []? Met: []? Not Met: []? Adjusted  2. Patient will demonstrate increased AROM to WNL to allow for proper joint functioning as indicated by patients Functional Deficits. []? Progressing: []? Met: []? Not Met: []? Adjusted  3. Patient will demonstrate an increase in strength to good scapular and core control  for UE to allow for proper functional mobility as indicated by patients Functional Deficits. []? Progressing: []? Met: []? Not Met: []? Adjusted  4. Patient will return to all functional activities without increased symptoms or restriction. []? Progressing: []? Met: []? Not Met: []? Adjusted  5. Patient will be able to lift 10lbs in his left hand without pain/dysfunction. []? Progressing: []? Met: []? Not Met: []? Adjusted         Overall Progression Towards Functional goals/ Treatment Progress Update:  [] Patient is progressing as expected towards functional goals listed. [] Progression is slowed due to complexities/Impairments listed. [] Progression has been slowed due to co-morbidities.   [x] Plan just implemented, too soon to assess goals progression <30days   [] Goals require adjustment due to lack of progress  [] Patient is not progressing as expected and requires additional follow up with physician  [] Other    Prognosis for POC: [x] Good [] Fair  [] Poor      Patient requires continued skilled intervention: [x] Yes  [] No    Treatment/Activity Tolerance:  [x] Patient able to complete treatment  [] Patient limited by fatigue  [] Patient limited by pain     [] Patient limited by other medical complications  [x] Other: 6/16 No complaints with today's program.  ROM is WNL  Able to progress resistance program today without problem. Added some light CKC activities today without any pain noted. Patient Education:      Access Code: [email or web address]  URL: [email or web address]/  Date: 05/21/2021  Prepared by: Laure Sandifer     Exercises  Supported Elbow Flexion Extension PROM - 2 x daily - 7 x weekly - 1 sets - 10 reps - 10 hold  Elbow Extension PROM - 2 x daily - 7 x weekly - 1 sets - 10 reps - 10 hold  Seated Wrist Supination Stretch - 2 x daily - 7 x weekly - 1 sets - 10 reps - 10 hold  Wrist Pronation Stretch - 2 x daily - 7 x weekly - 1 sets - 10 reps - 10 hold  Seated Wrist Extension Stretch - 2 x daily - 7 x weekly - 1 sets - 5 reps - 30 hold  Seated Wrist Flexion Stretch - 2 x daily - 7 x weekly - 1 sets - 5 reps - 30 hold  Ice - 2-3 x daily - 7 x weekly - 15 minutes hold                PLAN:   [x] Continue per plan of care [] Alter current plan (see comments above)  [] Plan of care initiated [] Hold pending MD visit [] Discharge    Progress per restrictions. Re-assess NPV for possible d/c to HEP. Electronically signed by:  Jovany Bear PT    Note: If patient does not return for scheduled/ recommended follow up visits, this note will serve as a discharge from care along with most recent update on progress.

## 2021-06-18 ENCOUNTER — HOSPITAL ENCOUNTER (OUTPATIENT)
Dept: PHYSICAL THERAPY | Age: 31
Setting detail: THERAPIES SERIES
Discharge: HOME | End: 2021-06-18
Payer: COMMERCIAL

## 2021-06-18 NOTE — FLOWSHEET NOTE
Physical Therapy  Cancellation/No-show Note  Patient Name:  Love Lundberg  :  1990   Date:  2021  Cancelled visits to date: 0  No-shows to date: 0    For today's appointment patient:  [x]  Cancelled  []  Rescheduled appointment  []  No-show     Reason given by patient:  []  Patient ill  []  Conflicting appointment  []  No transportation    []  Conflict with work  [x]  No reason given  []  Other:     Comments:      Electronically signed by:  Abhinav Anderson PT

## 2021-12-15 ENCOUNTER — OFFICE VISIT (OUTPATIENT)
Dept: PRIMARY CARE CLINIC | Age: 31
End: 2021-12-15
Payer: COMMERCIAL

## 2021-12-15 VITALS
WEIGHT: 173 LBS | HEIGHT: 69 IN | OXYGEN SATURATION: 99 % | HEART RATE: 56 BPM | SYSTOLIC BLOOD PRESSURE: 120 MMHG | TEMPERATURE: 98 F | BODY MASS INDEX: 25.62 KG/M2 | DIASTOLIC BLOOD PRESSURE: 80 MMHG

## 2021-12-15 DIAGNOSIS — N62 GYNECOMASTIA, MALE: ICD-10-CM

## 2021-12-15 DIAGNOSIS — Z13.29 SCREENING FOR THYROID DISORDER: ICD-10-CM

## 2021-12-15 DIAGNOSIS — Z00.00 WELL ADULT EXAM: Primary | ICD-10-CM

## 2021-12-15 DIAGNOSIS — Z13.0 SCREENING FOR DEFICIENCY ANEMIA: ICD-10-CM

## 2021-12-15 DIAGNOSIS — Z13.21 ENCOUNTER FOR VITAMIN DEFICIENCY SCREENING: ICD-10-CM

## 2021-12-15 DIAGNOSIS — Z13.220 SCREENING FOR CHOLESTEROL LEVEL: ICD-10-CM

## 2021-12-15 LAB
A/G RATIO: 1.7 (ref 1.1–2.2)
ALBUMIN SERPL-MCNC: 4.4 G/DL (ref 3.4–5)
ALP BLD-CCNC: 68 U/L (ref 40–129)
ALT SERPL-CCNC: 33 U/L (ref 10–40)
ANION GAP SERPL CALCULATED.3IONS-SCNC: 12 MMOL/L (ref 3–16)
AST SERPL-CCNC: 26 U/L (ref 15–37)
BASOPHILS ABSOLUTE: 0 K/UL (ref 0–0.2)
BASOPHILS RELATIVE PERCENT: 0.5 %
BILIRUB SERPL-MCNC: 1.6 MG/DL (ref 0–1)
BUN BLDV-MCNC: 14 MG/DL (ref 7–20)
CALCIUM SERPL-MCNC: 9.5 MG/DL (ref 8.3–10.6)
CHLORIDE BLD-SCNC: 101 MMOL/L (ref 99–110)
CHOLESTEROL, FASTING: 151 MG/DL (ref 0–199)
CO2: 24 MMOL/L (ref 21–32)
CREAT SERPL-MCNC: 0.8 MG/DL (ref 0.9–1.3)
EOSINOPHILS ABSOLUTE: 0.1 K/UL (ref 0–0.6)
EOSINOPHILS RELATIVE PERCENT: 2.2 %
ESTRADIOL LEVEL: 23 PG/ML
GFR AFRICAN AMERICAN: >60
GFR NON-AFRICAN AMERICAN: >60
GLUCOSE BLD-MCNC: 87 MG/DL (ref 70–99)
HCG QUALITATIVE: NEGATIVE
HCT VFR BLD CALC: 42.2 % (ref 40.5–52.5)
HDLC SERPL-MCNC: 67 MG/DL (ref 40–60)
HEMOGLOBIN: 14 G/DL (ref 13.5–17.5)
LDL CHOLESTEROL CALCULATED: 73 MG/DL
LUTEINIZING HORMONE: 2.9 MIU/ML
LYMPHOCYTES ABSOLUTE: 2 K/UL (ref 1–5.1)
LYMPHOCYTES RELATIVE PERCENT: 41.8 %
MCH RBC QN AUTO: 30.1 PG (ref 26–34)
MCHC RBC AUTO-ENTMCNC: 33.2 G/DL (ref 31–36)
MCV RBC AUTO: 90.7 FL (ref 80–100)
MONOCYTES ABSOLUTE: 0.6 K/UL (ref 0–1.3)
MONOCYTES RELATIVE PERCENT: 13.4 %
NEUTROPHILS ABSOLUTE: 2 K/UL (ref 1.7–7.7)
NEUTROPHILS RELATIVE PERCENT: 42.1 %
PDW BLD-RTO: 13.2 % (ref 12.4–15.4)
PLATELET # BLD: 232 K/UL (ref 135–450)
PMV BLD AUTO: 9.1 FL (ref 5–10.5)
POTASSIUM SERPL-SCNC: 3.8 MMOL/L (ref 3.5–5.1)
RBC # BLD: 4.65 M/UL (ref 4.2–5.9)
SODIUM BLD-SCNC: 137 MMOL/L (ref 136–145)
TOTAL PROTEIN: 7 G/DL (ref 6.4–8.2)
TRIGLYCERIDE, FASTING: 55 MG/DL (ref 0–150)
TSH REFLEX: 2.5 UIU/ML (ref 0.27–4.2)
VITAMIN D 25-HYDROXY: 7.8 NG/ML
VLDLC SERPL CALC-MCNC: 11 MG/DL
WBC # BLD: 4.8 K/UL (ref 4–11)

## 2021-12-15 PROCEDURE — 36415 COLL VENOUS BLD VENIPUNCTURE: CPT | Performed by: NURSE PRACTITIONER

## 2021-12-15 PROCEDURE — 99385 PREV VISIT NEW AGE 18-39: CPT | Performed by: NURSE PRACTITIONER

## 2021-12-15 NOTE — PROGRESS NOTES
[facility]  [address] [facility] [address]  [address]  YOB: 1990  Date of Service:  12/15/2021    Chief Complaint:   Renay Franklin is a 33 y.o. male who presents to establish care and for complete physical examination. Current concerns   Requesting testosterone testing d/t low libido and  has some concerns for  gynecomastia with tenderness at times      HPI:   Keila Gonzalez is here with his with his long term girlfriend, he sates he is from [facility] [place]", and is not working right now,  did not go into details. States he is pretty healthy with no real health history but for pre skin cancer about 10 years ago. Patient Active Problem List   Diagnosis    Color blindness    Gynecomastia, male       Preventive Care:  Health Maintenance   Topic Date Due    Varicella vaccine (1 of 2 - 2-dose childhood series) Never done    HIV screen  Never done    DTaP/Tdap/Td vaccine (1 - Tdap) Never done    Flu vaccine  Completed    COVID-19 Vaccine  Completed    Hepatitis A vaccine  Aged Out    Hepatitis B vaccine  Aged Out    Hib vaccine  Aged Out    Meningococcal (ACWY) vaccine  Aged Out    Pneumococcal 0-64 years Vaccine  Aged Out    Hepatitis C screen  Discontinued        Self-testicular exams: Yes  Self-breast exams: yes, noting enlargement,   Last eye exam: color blind, last exam  Age 1 , normal  Exercise: no regular exercise reports likes [employer]  Seatbelt use: 90%  Diet - poor eats everything and anything     Lipid panel:   Lab Results   Component Value Date    HDL 67 (H) 12/15/2021    [facility] 73 12/15/2021      The ASCVD Risk score (Bryn Sams., et al., 2013) failed to calculate for the following reasons:     The 2013 ASCVD risk score is only valid for ages 36 to 78    Advance Directive: N, <no information>    Immunization History   Administered Date(s) Administered    COVID-19, Pfizer, PF, 30mcg/0.3mL 03/27/2021, 04/17/2021, 11/20/2021    Influenza Virus Vaccine 11/10/2016, 01/02/2018, 08/30/2021    Influenza, MDCK Quadv, IM, PF (Flucelvax 2 yrs and older) 09/28/2020    Influenza, Quadv, IM, PF (6 mo and older Fluzone, Flulaval, Fluarix, and 3 yrs and older Afluria) 10/23/2018, 10/11/2019       No Known Allergies  No outpatient medications have been marked as taking for the 12/15/21 encounter (Office Visit) with CHRIST Luo CNP. Past Medical History:   Diagnosis Date    Cancer of skin     pre cancer 10 years ago, 2 back left big toe left neck     Past Surgical History:   Procedure Laterality Date    CIRCUMCISION      VASECTOMY      1 year ago     Family History   Problem Relation Age of Onset    Diabetes type 2  Paternal Grandmother     Heart Disease Paternal Grandfather     Heart Surgery Paternal Grandfather      Social History     Socioeconomic History    Marital status: Unknown     Spouse name: Not on file    Number of children: Not on file    Years of education: Not on file    Highest education level: Not on file   Occupational History    Not on file   Tobacco Use    Smoking status: Never Smoker    Smokeless tobacco: Never Used   Vaping Use    Vaping Use: Never used   Substance and Sexual Activity    Alcohol use: Not Currently    Drug use: Never    Sexual activity: Yes     Partners: Female   Other Topics Concern    Not on file   Social History Narrative    Not on file     Social Determinants of Health     Financial Resource Strain:     Difficulty of Paying Living Expenses: Not on file   Food Insecurity:     Worried About Running Out of Food in the Last Year: Not on file    Jackie of Food in the Last Year: Not on file   Transportation Needs:     Lack of Transportation (Medical): Not on file    Lack of Transportation (Non-Medical):  Not on file   Physical Activity:     Days of Exercise per Week: Not on file    Minutes of Exercise per Session: Not on file   Stress:  Feeling of Stress : Not on file   Social Connections:     Frequency of Communication with Friends and Family: Not on file    Frequency of Social Gatherings with Friends and Family: Not on file    Attends [employer] Services: Not on file    Active Member of Clubs or Organizations: Not on file    Attends Club or Organization Meetings: Not on file    Marital Status: Not on file   Intimate Partner Violence:     Fear of Current or Ex-Partner: Not on file    Emotionally Abused: Not on file    Physically Abused: Not on file    Sexually Abused: Not on file   Housing Stability:     Unable to Pay for Housing in the Last Year: Not on file    Number of [place] in the Last Year: Not on file    Unstable Housing in the Last Year: Not on file       HISTORY:  Patient's medications, allergies, past medical, and social histories were reviewed and updated as appropriate. LAST LABS    LDL Calculated   Date Value Ref Range Status   12/15/2021 73 <100 mg/dL Final     HDL   Date Value Ref Range Status   12/15/2021 67 (H) 40 - 60 mg/dL Final   No results found for: TRIG  Lab Results   Component Value Date    GLUCOSE 87 12/15/2021     Lab Results   Component Value Date     12/15/2021    K 3.8 12/15/2021    CREATININE 0.8 (L) 12/15/2021     Lab Results   Component Value Date    WBC 4.8 12/15/2021    HGB 14.0 12/15/2021    HCT 42.2 12/15/2021    MCV 90.7 12/15/2021     12/15/2021     Lab Results   Component Value Date    ALT 33 12/15/2021    AST 26 12/15/2021    ALKPHOS 68 12/15/2021    BILITOT 1.6 (H) 12/15/2021     No results found for: TSH  No results found for: LABA1C        Review of Systems:  Review of Systems   Constitutional: Negative for activity change, appetite change, chills and fever. HENT: Negative. Eyes: Negative. Respiratory: Negative for cough and shortness of breath. Cardiovascular: Negative for chest pain, palpitations and leg swelling.    Gastrointestinal: Negative for diarrhea, nausea and vomiting. Endocrine: Negative. Genitourinary: Negative for decreased urine volume, difficulty urinating, dysuria, enuresis, flank pain, frequency, genital sores, hematuria, penile discharge, penile pain, penile swelling, scrotal swelling, testicular pain and urgency. Musculoskeletal: Negative. Skin: Negative for rash. Reporting tender, breast L>R enlargement   Allergic/Immunologic: Negative. Neurological: Negative. Hematological: Negative. Psychiatric/Behavioral: Negative. Objective:     PHYSICAL EXAM     /80 (Site: Right Upper Arm, Position: Sitting, Cuff Size: Medium Adult)   Pulse 56   Temp 98 °F (36.7 °C)   Ht 5' 9\" (1.753 m)   Wt 173 lb (78.5 kg)   SpO2 99%   BMI 25.55 kg/m²   BP Readings from Last 5 Encounters:   12/15/21 120/80     Wt Readings from Last 5 Encounters:   12/15/21 173 lb (78.5 kg)   06/15/21 165 lb (74.8 kg)   05/19/21 165 lb (74.8 kg)   04/28/21 165 lb (74.8 kg)    Body mass index is 25.55 kg/m². GENERAL:   · well-developed, well-nourished, alert, no distress. EYES:   · External findings: lids and lashes normal and conjunctivae and sclerae normal  · Eyes: no periorbital cellulitis. ENT:   · External nose and ears appear normal  · normal TM's and external ear canals both ears  · Pharynx: normal. Exudates: None  · Lips, mucosa, and tongue normal  · Hearing grossly normal.     NECK:   · Supple, symmetrical, trachea midline  · Thyroid not enlarged, symmetric, no tenderness/mass/nodules  LYMPH:  · no cervical nodes, no supraclavicular nodes  LUNGS:    · Breathing unlabored  · clear to auscultation bilaterally and good air movement  CARDIOVASC:   · regular rate and rhythm, S1, S2 normal. No murmur, click, rub or gallop  · Apical impulse normal  · LEGS:  Lower extremity edema: none    · No carotid bruits  ABDOMEN:   · Soft, non-tender, no masses  · No hepatosplenomegaly  · No hernias noted.   Exam limited by N/A  SKIN: warm and dry  · No rashes or suspicious lesions  · No nodules or induration  PSYCH:    · Alert and oriented  · Normal reasoning, insight good  · Facial expressions full, mood appropriate  · No memory disturbance noted  MUSCULOSKEL:    · Gait normal, assistive device: none  · No significant finger or nail findings  · Spine symmetric, no deformities, no kyphosis     Breasts: breasts appear normal, no suspicious masses, no skin or nipple changes or axillary nodes, no axillary lymphadenopathy. Assessment/Plan  Talib Eden was seen today for annual exam.    Diagnoses and all orders for this visit:    Well adult exam  Patient appears healthy, well-nourished. Patient to follow-up with Derm for annual exams. Will call patient with lab results. -     CBC Auto Differential  -     Comprehensive Metabolic Panel  -     TSH with Reflex  -     Lipid, Fasting  -     Vitamin D 25 Hydroxy    Gynecomastia, male  No lumps/lymph or nipple discharge noted  -We will call patient with lab results consider mammogram/ultrasound  -     Comprehensive Metabolic Panel  -     Testosterone  -     LUTEINIZING HORMONE  -     ESTRADIOL  -     HCG, SERUM, QUALITATIVE    Screening for cholesterol level  -     Lipid, Fasting    Screening for thyroid disorder  -     TSH with Reflex    Encounter for vitamin deficiency screening  - Vitamin D 25 Hydroxy    Screening for deficiency anemia  -     CBC Auto Differential        Patient Education:    Counseled on importance of healthy diet and regular exercise of at least 30 minutes on four or more days during the week. Counseled on skin safety, SPF 27 or higher prior to going outdoors and reapplication every twohours while outside.  Monitor moles for changes, report to provider if greater than 6 mm, color variations, asymmetry, redness, scales, and/or overlying skin changes  Counseled on safety, wear seatbelt, do not consumealcohol and drive or drive with anyone who has consumed alcohol  Counseled on safe sex practices, wear condoms, limit number of sexual partners    Counseled on importance of monthly self testicular exams, monitor for newlumps/bumps/masses, tenderness, new asymmetry, redness, and overlying skin changes, report to provider    Counseled on importance of monthly self breast exams, perform on same time each month, monitor for newlumps/bumps/masses, tenderness, changes to size or contour, dimpling, nipple discharge, new nipple retraction, and overlying skin changes, report to provider

## 2021-12-15 NOTE — PATIENT INSTRUCTIONS
Patient Education        Well Visit, Ages 25 to 48: Care Instructions  Overview     Well visits can help you stay healthy. Your doctor has checked your overall health and may have suggested ways to take good care of yourself. Your doctor also may have recommended tests. At home, you can help prevent illness with healthy eating, regular exercise, and other steps. Follow-up care is a key part of your treatment and safety. Be sure to make and go to all appointments, and call your doctor if you are having problems. It's also a good idea to know your test results and keep a list of the medicines you take. How can you care for yourself at home? · Get screening tests that you and your doctor decide on. Screening helps find diseases before any symptoms appear. · Eat healthy foods. Choose fruits, vegetables, whole grains, protein, and low-fat dairy foods. Limit fat, especially saturated fat. Reduce salt in your diet. · Limit alcohol. If you are a man, have no more than 2 drinks a day or 14 drinks a week. If you are a woman, have no more than 1 drink a day or 7 drinks a week. · Get at least 30 minutes of physical activity on most days of the week. Walking is a good choice. You also may want to do other activities, such as running, swimming, cycling, or playing tennis or team sports. Discuss any changes in your exercise program with your doctor. · Reach and stay at a healthy weight. This will lower your risk for many problems, such as obesity, diabetes, heart disease, and high blood pressure. · Do not smoke or allow others to smoke around you. If you need help quitting, talk to your doctor about stop-smoking programs and medicines. These can increase your chances of quitting for good. · Care for your mental health. It is easy to get weighed down by worry and stress. Learn strategies to manage stress, like deep breathing and mindfulness, and stay connected with your family and community.  If you find you often feel sad or hopeless, talk with your doctor. Treatment can help. · Talk to your doctor about whether you have any risk factors for sexually transmitted infections (STIs). You can help prevent STIs if you wait to have sex with a new partner (or partners) until you've each been tested for STIs. It also helps if you use condoms (male or female condoms) and if you limit your sex partners to one person who only has sex with you. Vaccines are available for some STIs, such as HPV. · Use birth control if it's important to you to prevent pregnancy. Talk with your doctor about the choices available and what might be best for you. · If you think you may have a problem with alcohol or drug use, talk to your doctor. This includes prescription medicines (such as amphetamines and opioids) and illegal drugs (such as cocaine and methamphetamine). Your doctor can help you figure out what type of treatment is best for you. · Protect your skin from too much sun. When you're outdoors from 10 a.m. to 4 p.m., stay in the shade or cover up with clothing and a hat with a wide brim. Wear sunglasses that block UV rays. Even when it's cloudy, put broad-spectrum sunscreen (SPF 30 or higher) on any exposed skin. · See a dentist one or two times a year for checkups and to have your teeth cleaned. · Wear a seat belt in the car. When should you call for help? Watch closely for changes in your health, and be sure to contact your doctor if you have any problems or symptoms that concern you. Where can you learn more? Go to [email or web address] and sign in to your [employer] account. Enter P072 in the [employer] box to learn more about \"Well Visit, Ages 25 to 48: Care Instructions. \"     If you do not have an account, please click on the \"Sign Up Now\" link. Current as of: February 11, 2021               Content Version: 13.0  © 1574-9608 [employer].    Care instructions adapted under license by [id number] [employer]

## 2021-12-17 LAB — TESTOSTERONE TOTAL: 370 NG/DL (ref 220–1000)

## 2021-12-17 NOTE — RESULT ENCOUNTER NOTE
All labs look good, but for Vit D    vitamin D is 7.8 which is considered deficient, s/He would benefit from 12 week oral supplement that I will order- (Vit D) Ergocalciferol 50,000 units ONCE WEEKLY for 12 weeks. s/He will take ONE CAPSULE WEEKLY for 12 weeks   then a post treatment of over the counter Vitamin D 2000 units daily for two weeks then we will repeat labs.     Please call with any questions

## 2021-12-19 PROBLEM — N62 GYNECOMASTIA, MALE: Status: ACTIVE | Noted: 2021-12-19

## 2021-12-19 PROBLEM — H53.50 COLOR BLINDNESS: Status: ACTIVE | Noted: 2021-12-19

## 2021-12-19 ASSESSMENT — ENCOUNTER SYMPTOMS
VOMITING: 0
DIARRHEA: 0
EYES NEGATIVE: 1
ALLERGIC/IMMUNOLOGIC NEGATIVE: 1
COUGH: 0
SHORTNESS OF BREATH: 0
NAUSEA: 0

## 2021-12-20 DIAGNOSIS — E55.9 VITAMIN D DEFICIENCY: Primary | ICD-10-CM

## 2021-12-20 RX ORDER — ERGOCALCIFEROL 1.25 MG/1
50000 CAPSULE ORAL WEEKLY
Qty: 4 CAPSULE | Refills: 2 | Status: SHIPPED | OUTPATIENT
Start: 2021-12-20

## 2021-12-20 NOTE — PROGRESS NOTES
1. Vitamin D deficiency    - vitamin D (ERGOCALCIFEROL) 1.25 MG (01753 UT) CAPS capsule; Take 1 capsule by mouth once a week He will take one capsule weekly for 12 weeks then a post treatment of over the counter Vitamin D 2000 units daily for two weeks then we will repeat labs March/April. Dispense: 4 capsule;  Refill: 2

## 2021-12-21 PROBLEM — E55.9 VITAMIN D DEFICIENCY: Status: ACTIVE | Noted: 2021-12-21

## 2023-02-08 ENCOUNTER — OFFICE VISIT (OUTPATIENT)
Dept: PRIMARY CARE CLINIC | Age: 33
End: 2023-02-08

## 2023-02-08 VITALS
HEIGHT: 69 IN | TEMPERATURE: 98.4 F | WEIGHT: 167 LBS | SYSTOLIC BLOOD PRESSURE: 110 MMHG | HEART RATE: 62 BPM | OXYGEN SATURATION: 98 % | BODY MASS INDEX: 24.73 KG/M2 | DIASTOLIC BLOOD PRESSURE: 80 MMHG

## 2023-02-08 DIAGNOSIS — E55.9 VITAMIN D DEFICIENCY: ICD-10-CM

## 2023-02-08 DIAGNOSIS — J01.00 ACUTE MAXILLARY SINUSITIS, RECURRENCE NOT SPECIFIED: ICD-10-CM

## 2023-02-08 DIAGNOSIS — R09.89 THROAT CLEARING: ICD-10-CM

## 2023-02-08 DIAGNOSIS — Z87.09 HISTORY OF TONSILLITIS: ICD-10-CM

## 2023-02-08 DIAGNOSIS — J39.2 THROAT IRRITATION: ICD-10-CM

## 2023-02-08 DIAGNOSIS — Z00.01 ENCOUNTER FOR WELL ADULT EXAM WITH ABNORMAL FINDINGS: Primary | ICD-10-CM

## 2023-02-08 LAB — S PYO AG THROAT QL: NORMAL

## 2023-02-08 RX ORDER — FLUTICASONE PROPIONATE 50 MCG
2 SPRAY, SUSPENSION (ML) NASAL DAILY
Qty: 16 G | Refills: 0 | Status: SHIPPED | OUTPATIENT
Start: 2023-02-08

## 2023-02-08 NOTE — PROGRESS NOTES
[facility]  [address] [address]  [address]  YOB: 1990  Date of Service:  2/8/2023    Chief Complaint:   Amilcar Petersen is a 27 y.o. male who presents for complete physical examination.      Current concerns   - itchy throat has taken claritin, somewhat helpful  -restless and sleep difficulty when having to clear throat   - clearing throat all the time better in the day worse at night and will wake him    HPI:     Past Medical History:   Diagnosis Date    Cancer of skin     pre cancer 10 years ago, 2 back left big toe left neck     No Known Allergies  Patient Active Problem List   Diagnosis    Color blindness    Gynecomastia, male    Vitamin D deficiency     Outpatient Medications Marked as Taking for the 2/8/23 encounter (Office Visit) with CHRIST Meredith CNP   Medication Sig Dispense Refill    fluticasone (FLONASE) 50 MCG/ACT nasal spray 2 sprays by Each Nostril route daily 16 g 0     Past Surgical History:   Procedure Laterality Date    CIRCUMCISION      VASECTOMY      1 year ago     Preventive Care:    Health Maintenance   Topic Date Due    Varicella vaccine (1 of 2 - 2-dose childhood series) Never done    Depression Screen  Never done    DTaP/Tdap/Td vaccine (2 - Td or Tdap) 03/30/2032    Flu vaccine  Completed    COVID-19 Vaccine  Completed    HIV screen  Completed    Hepatitis A vaccine  Aged Out    Hib vaccine  Aged Out    Meningococcal (ACWY) vaccine  Aged Out    Pneumococcal 0-64 years Vaccine  Aged Out    Hepatitis C screen  Discontinued           Self-testicular exams: Yes  Self-breast exams: yes  Last eye exam: grade school no issues, normal  Dental exam:every 6 mos noting parodontal diease   Exercise: no regular exercise  DERM- due for f/u   Seatbelt use: 100%    Lipid panel:   Lab Results   Component Value Date    HDL 69 (H) 02/08/2023    [address] 86 02/08/2023      The ASCVD Risk score (Thomas GONZALEZ, et al., 2019) failed to calculate for the following reasons:     The 2019 ASCVD risk score is only valid for ages 36 to 78    Advance Directive: N, <no information>    Health Maintenance   Topic Date Due    Varicella vaccine (1 of 2 - 2-dose childhood series) Never done    Depression Screen  Never done    DTaP/Tdap/Td vaccine (2 - Td or Tdap) 03/30/2032    Flu vaccine  Completed    COVID-19 Vaccine  Completed    HIV screen  Completed    Hepatitis A vaccine  Aged Out    Hib vaccine  Aged Out    Meningococcal (ACWY) vaccine  Aged Out    Pneumococcal 0-64 years Vaccine  Aged Out    Hepatitis C screen  Discontinued     Immunization History   Administered Date(s) Administered    COVID-19, PFIZER Bivalent BOOSTER, DO NOT Dilute, (age 12y+), IM, 30 mcg/0.3 mL 10/13/2022    COVID-19, PFIZER PURPLE top, DILUTE for use, (age 15 y+), 30mcg/0.3mL 03/27/2021, 04/17/2021, 11/20/2021    Influenza Virus Vaccine 11/10/2016, 01/02/2018, 08/30/2021    Influenza, FLUARIX, FLULAVAL, FLUZONE (age 10 mo+) AND AFLURIA, (age 1 y+), PF, 0.5mL 10/23/2018, 10/11/2019    Influenza, FLUCELVAX, (age 10 mo+), MDCK, PF, 0.5mL 09/28/2020     Family History   Problem Relation Age of Onset    Diabetes type 2  Paternal Grandmother     Heart Disease Paternal Grandfather     Heart Surgery Paternal Grandfather      Social History     Socioeconomic History    Marital status: Unknown     Spouse name: Not on file    Number of children: Not on file    Years of education: Not on file    Highest education level: Not on file   Occupational History    Not on file   Tobacco Use    Smoking status: Never    Smokeless tobacco: Never   Vaping Use    Vaping Use: Never used   Substance and Sexual Activity    Alcohol use: Not Currently    Drug use: Never    Sexual activity: Yes     Partners: Female   Other Topics Concern    Not on file   Social History Narrative    Not on file     Social Determinants of Health     Financial Resource Strain: Not on file   Food Insecurity: Not on file Transportation Needs: Not on file   Physical Activity: Not on file   Stress: Not on file   Social Connections: Not on file   Intimate Partner Violence: Not on file   Housing Stability: Not on file       HISTORY:  Patient's medications, allergies, past medical, and social histories were reviewed and updated as appropriate. LAST LABS    LDL Calculated   Date Value Ref Range Status   02/08/2023 88 <100 mg/dL Final     HDL   Date Value Ref Range Status   02/08/2023 69 (H) 40 - 60 mg/dL Final   No results found for: TRIG  Lab Results   Component Value Date    GLUCOSE 88 02/08/2023     Lab Results   Component Value Date     02/08/2023    K 4.5 02/08/2023    CREATININE 0.7 (L) 02/08/2023     Lab Results   Component Value Date    WBC 4.0 02/08/2023    HGB 13.7 02/08/2023    HCT 40.8 02/08/2023    MCV 90.3 02/08/2023     02/08/2023     Lab Results   Component Value Date    ALT 37 02/08/2023    AST 25 02/08/2023    ALKPHOS 68 02/08/2023    BILITOT 1.1 (H) 02/08/2023     No results found for: TSH  Lab Results   Component Value Date    LABA1C 4.5 02/08/2023       Review of Systems:  Review of Systems   Constitutional:  Negative for activity change, appetite change, fatigue and fever. HENT:  Positive for rhinorrhea. Negative for congestion, sore throat (throat clearing) and trouble swallowing. Respiratory:  Negative for cough and shortness of breath. Cardiovascular:  Negative for chest pain, palpitations and leg swelling. Gastrointestinal:  Negative for constipation, diarrhea, nausea and vomiting. Endocrine: Negative. Genitourinary: Negative. Skin:  Negative for rash. Neurological: Negative. Hematological: Negative. Psychiatric/Behavioral: Negative.          Objective:     PHYSICAL EXAM     /80   Pulse 62   Temp 98.4 °F (36.9 °C)   Ht 5' 9.39\" (1.763 m)   Wt 167 lb (75.8 kg)   SpO2 98%   BMI 24.39 kg/m²   BP Readings from Last 5 Encounters:   02/08/23 110/80   12/15/21 120/80     Wt Readings from Last 5 Encounters:   02/08/23 167 lb (75.8 kg)   12/15/21 173 lb (78.5 kg)   06/15/21 165 lb (74.8 kg)   05/19/21 165 lb (74.8 kg)   04/28/21 165 lb (74.8 kg)    Body mass index is 24.39 kg/m². Physical Exam  Vitals and nursing note reviewed. Constitutional:       General: He is not in acute distress. Appearance: Normal appearance. He is normal weight. He is not ill-appearing. HENT:      Head: Normocephalic and atraumatic. Right Ear: Tympanic membrane, ear canal and external ear normal.      Left Ear: Tympanic membrane, ear canal and external ear normal.      Nose: Mucosal edema and rhinorrhea present. Mouth/Throat:      Mouth: Mucous membranes are moist.      Dentition: Abnormal dentition. Pharynx: Oropharynx is clear. Uvula midline. Posterior oropharyngeal erythema present. Tonsils: 1+ on the right. 2+ on the left. Eyes:      Extraocular Movements: Extraocular movements intact. Conjunctiva/sclera: Conjunctivae normal.      Pupils: Pupils are equal, round, and reactive to light. Cardiovascular:      Rate and Rhythm: Normal rate and regular rhythm. Pulses: Normal pulses. Heart sounds: Normal heart sounds. Pulmonary:      Effort: Pulmonary effort is normal.      Breath sounds: Normal breath sounds. Abdominal:      General: Abdomen is flat. Bowel sounds are normal.      Palpations: Abdomen is soft. Musculoskeletal:         General: Normal range of motion. Cervical back: Normal range of motion and neck supple. Skin:     General: Skin is warm and dry. Capillary Refill: Capillary refill takes less than 2 seconds. Neurological:      General: No focal deficit present. Mental Status: He is alert and oriented to person, place, and time.    Psychiatric:         Mood and Affect: Mood normal.         Behavior: Behavior normal.          Assessment/Plan    Kerry Villalba was seen today for annual exam.    Diagnoses and all orders for this visit:    Encounter for well adult exam with abnormal findings  - will call with lab results and POC  - pt to work on regular exercise and healthy diet   - f/u with Derm for annual screening  - Keep scheduled dental appointments   -     HIV Screen  -     Hepatitis C Antibody  -     CBC with Auto Differential  -     Hemoglobin A1C  -     Comprehensive Metabolic Panel  -     TSH with Reflex  -     Lipid, Fasting    Acute maxillary sinusitis, recurrence not specified  Throat clearing  Throat irritation  History of tonsillitis  Pt is c/o ongoing throat clearing with h/o tonsil stones, noting enlarged tonsils on exam, pt denies difficulty with swallowing, or GERD s/s   Noting  POCT rapid strep A- negative,   Will send for Culture, Throat- will call with results  Plan is to treat for sinusitis with fluticasone (FLONASE) 50 MCG/ACT nasal spray; 2 sprays by Each Nostril route daily, monitor of worsen or improving s/s  - Possible need for EGD, sinus xray/CT and or ENT or GI referral     Vitamin D deficiency  -     Vitamin D 25 Hydroxy- will call with results      Patient given educational handouts and has had all questions answered. Patient voices understanding and agrees to plans along with risks and benefits of plan. Patient is instructed to continue prior meds, diet, and exercise plans as instructed. Patient agrees to follow up as instructed and sooner if needed. Patient agrees to go to ER if condition becomes emergent. This dictation was performed with a verbal recognition program (DRAGON) and it was checked for errors. It is possible that there are still dictated errors within this office note. If so, please bring any errors to my attention for an addendum. All efforts were made to ensure that this office note is accurate. Patient Education:    Counseled on importance of healthy diet and regular exercise of at least 30 minutes on four or more days during the week.   Counseled on skin safety, SPF 27 or higher prior to going outdoors and reapplication every twohours while outside.  Monitor moles for changes, report to provider if greater than 6 mm, color variations, asymmetry, redness, scales, and/or overlying skin changes  Counseled on safety, wear seatbelt, do not consumealcohol and drive or drive with anyone who has consumed alcohol  Counseled on safe sex practices, wear condoms, limit number of sexual partners  Counseled on importance of monthly self testicular exams, monitor for newlumps/bumps/masses, tenderness, new asymmetry, redness, and overlying skin changes, report to provider  Counseled on importance of monthly self breast exams, perform on same time each month, monitor for newlumps/bumps/masses, tenderness, changes to size or contour, dimpling, nipple discharge, new nipple retraction, and overlying skin changes, report to provider

## 2023-02-08 NOTE — PATIENT INSTRUCTIONS
Start flonase, follow up with ENT  Follow up with Derm  I will call with labs once all are completed

## 2023-02-09 LAB
A/G RATIO: 1.6 (ref 1.1–2.2)
ALBUMIN SERPL-MCNC: 4.4 G/DL (ref 3.4–5)
ALP BLD-CCNC: 68 U/L (ref 40–129)
ALT SERPL-CCNC: 37 U/L (ref 10–40)
ANION GAP SERPL CALCULATED.3IONS-SCNC: 12 MMOL/L (ref 3–16)
AST SERPL-CCNC: 25 U/L (ref 15–37)
BASOPHILS ABSOLUTE: 0 K/UL (ref 0–0.2)
BASOPHILS RELATIVE PERCENT: 0.7 %
BILIRUB SERPL-MCNC: 1.1 MG/DL (ref 0–1)
BUN BLDV-MCNC: 12 MG/DL (ref 7–20)
CALCIUM SERPL-MCNC: 9.7 MG/DL (ref 8.3–10.6)
CHLORIDE BLD-SCNC: 101 MMOL/L (ref 99–110)
CHOLESTEROL, FASTING: 170 MG/DL (ref 0–199)
CO2: 26 MMOL/L (ref 21–32)
CREAT SERPL-MCNC: 0.7 MG/DL (ref 0.9–1.3)
EOSINOPHILS ABSOLUTE: 0.2 K/UL (ref 0–0.6)
EOSINOPHILS RELATIVE PERCENT: 5.9 %
ESTIMATED AVERAGE GLUCOSE: 82.5 MG/DL
GFR SERPL CREATININE-BSD FRML MDRD: >60 ML/MIN/{1.73_M2}
GLUCOSE BLD-MCNC: 88 MG/DL (ref 70–99)
HBA1C MFR BLD: 4.5 %
HCT VFR BLD CALC: 40.8 % (ref 40.5–52.5)
HDLC SERPL-MCNC: 69 MG/DL (ref 40–60)
HEMOGLOBIN: 13.7 G/DL (ref 13.5–17.5)
HEPATITIS C ANTIBODY INTERPRETATION: NORMAL
HIV AG/AB: NORMAL
HIV ANTIGEN: NORMAL
HIV-1 ANTIBODY: NORMAL
HIV-2 AB: NORMAL
LDL CHOLESTEROL CALCULATED: 88 MG/DL
LYMPHOCYTES ABSOLUTE: 1.5 K/UL (ref 1–5.1)
LYMPHOCYTES RELATIVE PERCENT: 37.9 %
MCH RBC QN AUTO: 30.2 PG (ref 26–34)
MCHC RBC AUTO-ENTMCNC: 33.4 G/DL (ref 31–36)
MCV RBC AUTO: 90.3 FL (ref 80–100)
MONOCYTES ABSOLUTE: 0.5 K/UL (ref 0–1.3)
MONOCYTES RELATIVE PERCENT: 13.1 %
NEUTROPHILS ABSOLUTE: 1.7 K/UL (ref 1.7–7.7)
NEUTROPHILS RELATIVE PERCENT: 42.4 %
PDW BLD-RTO: 13.3 % (ref 12.4–15.4)
PLATELET # BLD: 245 K/UL (ref 135–450)
PMV BLD AUTO: 8.8 FL (ref 5–10.5)
POTASSIUM SERPL-SCNC: 4.5 MMOL/L (ref 3.5–5.1)
RBC # BLD: 4.52 M/UL (ref 4.2–5.9)
SODIUM BLD-SCNC: 139 MMOL/L (ref 136–145)
TOTAL PROTEIN: 7.1 G/DL (ref 6.4–8.2)
TRIGLYCERIDE, FASTING: 67 MG/DL (ref 0–150)
TSH REFLEX: 2.3 UIU/ML (ref 0.27–4.2)
VITAMIN D 25-HYDROXY: 16 NG/ML
VLDLC SERPL CALC-MCNC: 13 MG/DL
WBC # BLD: 4 K/UL (ref 4–11)

## 2023-02-10 ENCOUNTER — TELEPHONE (OUTPATIENT)
Dept: PRIMARY CARE CLINIC | Age: 33
End: 2023-02-10

## 2023-02-10 DIAGNOSIS — E55.9 VITAMIN D DEFICIENCY: Primary | ICD-10-CM

## 2023-02-10 RX ORDER — ERGOCALCIFEROL 1.25 MG/1
50000 CAPSULE ORAL WEEKLY
Qty: 4 CAPSULE | Refills: 2 | Status: SHIPPED | OUTPATIENT
Start: 2023-02-10

## 2023-02-10 NOTE — TELEPHONE ENCOUNTER
Patient called and stated that he was supposed to be on a Vitamin D supplement but has not been taking it, and noted that his levels continue to be low. He is asking for a prescription to be sent in to his pharmacy. Please advise.     Electronically signed by Marylee Alderman, APRN - CNP on 2/10/2023 at 1:21 PM

## 2023-02-10 NOTE — PROGRESS NOTES
1. Vitamin D deficiency  - vitamin D (ERGOCALCIFEROL) 1.25 MG (41597 UT) CAPS capsule; Take 1 capsule by mouth once a week for 12 weeks then a post treatment of over the counter Vitamin D 2000 units daily for two weeks then we will repeat labs. Dispense: 4 capsule;  Refill: 2

## 2023-02-11 LAB — THROAT CULTURE: NORMAL

## 2023-02-12 ASSESSMENT — ENCOUNTER SYMPTOMS
VOMITING: 0
DIARRHEA: 0
SORE THROAT: 0
COUGH: 0
RHINORRHEA: 1
TROUBLE SWALLOWING: 0
NAUSEA: 0
SHORTNESS OF BREATH: 0
CONSTIPATION: 0

## 2023-03-12 DIAGNOSIS — R09.89 THROAT CLEARING: ICD-10-CM

## 2023-03-12 DIAGNOSIS — J01.00 ACUTE MAXILLARY SINUSITIS, RECURRENCE NOT SPECIFIED: ICD-10-CM

## 2023-03-12 DIAGNOSIS — J39.2 THROAT IRRITATION: ICD-10-CM

## 2023-03-13 RX ORDER — FLUTICASONE PROPIONATE 50 MCG
SPRAY, SUSPENSION (ML) NASAL
Qty: 16 G | Refills: 2 | Status: SHIPPED | OUTPATIENT
Start: 2023-03-13

## 2023-05-15 DIAGNOSIS — E55.9 VITAMIN D DEFICIENCY: ICD-10-CM

## 2023-05-15 RX ORDER — ERGOCALCIFEROL 1.25 MG/1
50000 CAPSULE ORAL WEEKLY
Qty: 4 CAPSULE | Refills: 2 | OUTPATIENT
Start: 2023-05-15

## 2023-05-22 ENCOUNTER — NURSE ONLY (OUTPATIENT)
Dept: PRIMARY CARE CLINIC | Age: 33
End: 2023-05-22
Payer: COMMERCIAL

## 2023-05-22 DIAGNOSIS — E55.9 VITAMIN D DEFICIENCY: Primary | ICD-10-CM

## 2023-05-22 PROCEDURE — 36415 COLL VENOUS BLD VENIPUNCTURE: CPT | Performed by: NURSE PRACTITIONER

## 2023-05-22 NOTE — PROGRESS NOTES
Pt here for f/u vit D labs    Left AC one attempt, butterfly, one SST tube      ICD-10-CM    1. Vitamin D deficiency  E55.9 Vitamin D 25 Hydroxy

## 2023-05-23 LAB — 25(OH)D3 SERPL-MCNC: 34.9 NG/ML

## 2024-02-09 ENCOUNTER — OFFICE VISIT (OUTPATIENT)
Dept: PRIMARY CARE CLINIC | Age: 34
End: 2024-02-09
Payer: COMMERCIAL

## 2024-02-09 VITALS
HEIGHT: 70 IN | OXYGEN SATURATION: 98 % | DIASTOLIC BLOOD PRESSURE: 72 MMHG | WEIGHT: 166 LBS | BODY MASS INDEX: 23.77 KG/M2 | SYSTOLIC BLOOD PRESSURE: 120 MMHG | HEART RATE: 65 BPM | TEMPERATURE: 98.2 F

## 2024-02-09 DIAGNOSIS — Z00.01 ENCOUNTER FOR WELL ADULT EXAM WITH ABNORMAL FINDINGS: Primary | ICD-10-CM

## 2024-02-09 DIAGNOSIS — R09.A2 GLOBUS SENSATION: ICD-10-CM

## 2024-02-09 PROCEDURE — 99395 PREV VISIT EST AGE 18-39: CPT | Performed by: NURSE PRACTITIONER

## 2024-02-09 RX ORDER — OMEPRAZOLE 20 MG/1
20 CAPSULE, DELAYED RELEASE ORAL
Qty: 30 CAPSULE | Refills: 0 | Status: SHIPPED | OUTPATIENT
Start: 2024-02-09

## 2024-02-09 NOTE — PROGRESS NOTES
[place] [id number]  [phone]       Kristopher Pacheco  YOB: 1990  Date of Service:  2/9/2024    Chief Complaint:   Kristopher Pacheco is a 33 y.o. male who presents for complete physical examination.       Current concerns   Intermittent difficulty with swallowing food, feels tight \"like a rock\" over the past 2 days seems worse off and on for some time.  Denies sore throat, vomiting h/o GERD or heartburn.     Currently work for a [employer] in [employer]           2/9/2024     1:14 PM   PHQ Scores   PHQ2 Score 0   PHQ9 Score 0     Interpretation of Total Score Depression Severity: 1-4 = Minimal depression, 5-9 = Mild depression, 10-14 = Moderate depression, 15-19 = Moderately severe depression, 20-27 = Severe depression      2/9/2024     1:15 PM   SAUNDRA 7 SCORE   SAUNDRA-7 Total Score 0     Interpretation of SAUNDRA-7 score: 5-9 = mild anxiety, 10-14 = moderate anxiety, 15+ = severe anxiety. Recommend referral to behavioral health for scores 10 or greater.    HPI:     Past Medical History:   Diagnosis Date    Cancer of skin     pre cancer 10 years ago, 2 back left big toe left neck     Allergies   Allergen Reactions    Benadryl [Diphenhydramine] Other (See Comments)     Restless arms     Patient Active Problem List   Diagnosis    Color blindness    Gynecomastia, male    Vitamin D deficiency    Globus sensation     Outpatient Medications Marked as Taking for the 2/9/24 encounter (Office Visit) with Juliann Stevens APRN - CNP   Medication Sig Dispense Refill    omeprazole (PRILOSEC) 20 MG delayed release capsule Take 1 capsule by mouth every morning (before breakfast) 30 capsule 0    fluticasone (FLONASE) 50 MCG/ACT nasal spray SPRAY 2 SPRAYS INTO EACH NOSTRIL EVERY DAY 16 g 2     Past Surgical History:   Procedure Laterality Date    CIRCUMCISION      NASAL SEPTUM SURGERY  04/18/2023    VASECTOMY      1 year ago     Preventive Care:    Health

## 2024-02-09 NOTE — PATIENT INSTRUCTIONS
Monitor Globus sensation start Prilosec follow up in 3-4 weeks sooner if needed   Follow up for needed labs

## 2024-02-13 PROBLEM — R09.A2 GLOBUS SENSATION: Status: ACTIVE | Noted: 2024-02-13

## 2024-04-05 DIAGNOSIS — J01.00 ACUTE MAXILLARY SINUSITIS, RECURRENCE NOT SPECIFIED: ICD-10-CM

## 2024-04-05 DIAGNOSIS — R09.89 THROAT CLEARING: ICD-10-CM

## 2024-04-05 DIAGNOSIS — J39.2 THROAT IRRITATION: ICD-10-CM

## 2024-04-05 NOTE — TELEPHONE ENCOUNTER
Medication:   Requested Prescriptions     Pending Prescriptions Disp Refills    fluticasone (FLONASE) 50 MCG/ACT nasal spray 16 g 2     Si sprays by Each Nostril route daily        Last Filled:  2023, 16 g, 2 refills    Patient Phone Number: [phone] (home)     Last appt: 2024   Next appt: Visit date not found    Last OARRS:        No data to display

## 2024-04-08 RX ORDER — FLUTICASONE PROPIONATE 50 MCG
2 SPRAY, SUSPENSION (ML) NASAL DAILY
Qty: 16 G | Refills: 2 | Status: SHIPPED | OUTPATIENT
Start: 2024-04-08

## 2024-06-21 ENCOUNTER — NURSE ONLY (OUTPATIENT)
Dept: PRIMARY CARE CLINIC | Age: 34
End: 2024-06-21
Payer: COMMERCIAL

## 2024-06-21 DIAGNOSIS — Z13.1 SCREENING FOR DIABETES MELLITUS: ICD-10-CM

## 2024-06-21 DIAGNOSIS — Z00.00 WELL ADULT HEALTH CHECK: Primary | ICD-10-CM

## 2024-06-21 DIAGNOSIS — Z13.29 SCREENING FOR THYROID DISORDER: ICD-10-CM

## 2024-06-21 DIAGNOSIS — E55.9 VITAMIN D DEFICIENCY: ICD-10-CM

## 2024-06-21 DIAGNOSIS — R53.83 FATIGUE, UNSPECIFIED TYPE: ICD-10-CM

## 2024-06-21 DIAGNOSIS — G25.81 RESTLESS LEGS: ICD-10-CM

## 2024-06-21 DIAGNOSIS — Z13.220 SCREENING FOR HYPERLIPIDEMIA: ICD-10-CM

## 2024-06-21 DIAGNOSIS — Z13.0 SCREENING FOR DEFICIENCY ANEMIA: ICD-10-CM

## 2024-06-21 PROCEDURE — 36415 COLL VENOUS BLD VENIPUNCTURE: CPT | Performed by: NURSE PRACTITIONER

## 2024-06-22 LAB
25(OH)D3 SERPL-MCNC: 26.6 NG/ML
ALBUMIN SERPL-MCNC: 4.4 G/DL (ref 3.4–5)
ALBUMIN/GLOB SERPL: 1.8 {RATIO} (ref 1.1–2.2)
ALP SERPL-CCNC: 69 U/L (ref 40–129)
ALT SERPL-CCNC: 25 U/L (ref 10–40)
ANION GAP SERPL CALCULATED.3IONS-SCNC: 16 MMOL/L (ref 3–16)
AST SERPL-CCNC: 28 U/L (ref 15–37)
BASOPHILS # BLD: 0.1 K/UL (ref 0–0.2)
BASOPHILS NFR BLD: 0.9 %
BILIRUB SERPL-MCNC: 1.2 MG/DL (ref 0–1)
BUN SERPL-MCNC: 20 MG/DL (ref 7–20)
CALCIUM SERPL-MCNC: 9.2 MG/DL (ref 8.3–10.6)
CHLORIDE SERPL-SCNC: 107 MMOL/L (ref 99–110)
CHOLEST SERPL-MCNC: 147 MG/DL (ref 0–199)
CO2 SERPL-SCNC: 21 MMOL/L (ref 21–32)
CREAT SERPL-MCNC: 0.8 MG/DL (ref 0.9–1.3)
DEPRECATED RDW RBC AUTO: 13.6 % (ref 12.4–15.4)
EOSINOPHIL # BLD: 0.2 K/UL (ref 0–0.6)
EOSINOPHIL NFR BLD: 3.5 %
EST. AVERAGE GLUCOSE BLD GHB EST-MCNC: 88.2 MG/DL
FERRITIN SERPL IA-MCNC: 113.1 NG/ML (ref 30–400)
GFR SERPLBLD CREATININE-BSD FMLA CKD-EPI: >90 ML/MIN/{1.73_M2}
GLUCOSE SERPL-MCNC: 91 MG/DL (ref 70–99)
HBA1C MFR BLD: 4.7 %
HCT VFR BLD AUTO: 40.9 % (ref 40.5–52.5)
HDLC SERPL-MCNC: 70 MG/DL (ref 40–60)
HGB BLD-MCNC: 13.7 G/DL (ref 13.5–17.5)
IRON SATN MFR SERPL: 37 % (ref 20–50)
IRON SERPL-MCNC: 90 UG/DL (ref 59–158)
LDL CHOLESTEROL: 61 MG/DL
LYMPHOCYTES # BLD: 2.3 K/UL (ref 1–5.1)
LYMPHOCYTES NFR BLD: 41.2 %
MCH RBC QN AUTO: 30.4 PG (ref 26–34)
MCHC RBC AUTO-ENTMCNC: 33.5 G/DL (ref 31–36)
MCV RBC AUTO: 90.9 FL (ref 80–100)
MONOCYTES # BLD: 0.7 K/UL (ref 0–1.3)
MONOCYTES NFR BLD: 13.3 %
NEUTROPHILS # BLD: 2.3 K/UL (ref 1.7–7.7)
NEUTROPHILS NFR BLD: 41.1 %
PLATELET # BLD AUTO: 258 K/UL (ref 135–450)
PMV BLD AUTO: 8.6 FL (ref 5–10.5)
POTASSIUM SERPL-SCNC: 4.1 MMOL/L (ref 3.5–5.1)
PROT SERPL-MCNC: 6.9 G/DL (ref 6.4–8.2)
RBC # BLD AUTO: 4.5 M/UL (ref 4.2–5.9)
SODIUM SERPL-SCNC: 144 MMOL/L (ref 136–145)
TIBC SERPL-MCNC: 242 UG/DL (ref 260–445)
TRIGL SERPL-MCNC: 82 MG/DL (ref 0–150)
TSH SERPL DL<=0.005 MIU/L-ACNC: 3.67 UIU/ML (ref 0.27–4.2)
VLDLC SERPL CALC-MCNC: 16 MG/DL
WBC # BLD AUTO: 5.6 K/UL (ref 4–11)

## 2024-06-24 ENCOUNTER — TELEPHONE (OUTPATIENT)
Dept: ORTHOPEDIC SURGERY | Age: 34
End: 2024-06-24

## 2024-06-24 ENCOUNTER — OFFICE VISIT (OUTPATIENT)
Age: 34
End: 2024-06-24
Payer: COMMERCIAL

## 2024-06-24 VITALS — WEIGHT: 168 LBS | HEIGHT: 70 IN | BODY MASS INDEX: 24.05 KG/M2

## 2024-06-24 DIAGNOSIS — M53.3 ACUTE COCCYGEAL PAIN: ICD-10-CM

## 2024-06-24 DIAGNOSIS — M79.674 PAIN IN TOE OF RIGHT FOOT: ICD-10-CM

## 2024-06-24 DIAGNOSIS — S63.501A RIGHT WRIST SPRAIN, INITIAL ENCOUNTER: Primary | ICD-10-CM

## 2024-06-24 PROCEDURE — 99202 OFFICE O/P NEW SF 15 MIN: CPT | Performed by: ORTHOPAEDIC SURGERY

## 2024-06-24 RX ORDER — MELOXICAM 15 MG/1
15 TABLET ORAL DAILY
COMMUNITY
Start: 2024-06-22

## 2024-06-25 PROBLEM — S63.501A RIGHT WRIST SPRAIN, INITIAL ENCOUNTER: Status: ACTIVE | Noted: 2024-06-25

## 2024-06-25 NOTE — PROGRESS NOTES
position.    Radiology:     X-rays obtained 6/22/24 at urgent care a brought on disc and reviewed in office:  Views PA, oblique and lateral view right wrist  Impression normal joint alignment.  No widening of the scapholunate interval.  Normal scapholunate angle on the lateral view.  No evidence for displaced fracture line.        Impression:  Encounter Diagnoses   Name Primary?    Right wrist sprain, initial encounter Yes    Pain in toe of right foot     Acute coccygeal pain        Treatment Plan:  I reviewed his x-ray images of his wrist with him.  While he was told at urgent care he possibly had a fracture line they may have been looking at the radial ulnar notch on the oblique view.  I see no evidence of definitive fracture at this time.  He can continue using his wrist brace and to resume his activities as he tolerates.  I did explain to him that if he has persistent pain he should return for repeat x-rays in 1 to 2 weeks as sometimes delayed x-ray can show fracture healing when a fracture line was not evident on the index film.  As far as his coccyx is concerned he can continue with activities as he tolerates.  Also, his right fourth toe likely sustained a small contusion but I do not see any major issue structurally that should not cause continued pain.  Again, if any of the symptoms fail to improve over the next 2 to 3 weeks he should follow-up for repeat assessment.    He understands and accepts this course of care.    Documentation was done using voice recognition dragon software.  Every effort was made to ensure accuracy; however, inadvertent  Unintentional computerized transcription errors may be present.

## 2024-07-02 ENCOUNTER — TELEPHONE (OUTPATIENT)
Age: 34
End: 2024-07-02

## 2025-02-27 SDOH — HEALTH STABILITY: PHYSICAL HEALTH
ON AVERAGE, HOW MANY DAYS PER WEEK DO YOU ENGAGE IN MODERATE TO STRENUOUS EXERCISE (LIKE A BRISK WALK)?: PATIENT DECLINED

## 2025-02-27 SDOH — HEALTH STABILITY: PHYSICAL HEALTH: ON AVERAGE, HOW MANY MINUTES DO YOU ENGAGE IN EXERCISE AT THIS LEVEL?: PATIENT DECLINED

## 2025-02-28 ENCOUNTER — OFFICE VISIT (OUTPATIENT)
Dept: PRIMARY CARE CLINIC | Age: 35
End: 2025-02-28
Payer: COMMERCIAL

## 2025-02-28 VITALS
BODY MASS INDEX: 24.45 KG/M2 | SYSTOLIC BLOOD PRESSURE: 98 MMHG | OXYGEN SATURATION: 98 % | DIASTOLIC BLOOD PRESSURE: 68 MMHG | HEIGHT: 70 IN | HEART RATE: 65 BPM | WEIGHT: 170.8 LBS | RESPIRATION RATE: 15 BRPM

## 2025-02-28 DIAGNOSIS — J30.2 SEASONAL ALLERGIC RHINITIS, UNSPECIFIED TRIGGER: ICD-10-CM

## 2025-02-28 DIAGNOSIS — N62 GYNECOMASTIA, MALE: ICD-10-CM

## 2025-02-28 DIAGNOSIS — Z76.89 ENCOUNTER TO ESTABLISH CARE: Primary | ICD-10-CM

## 2025-02-28 PROCEDURE — 99203 OFFICE O/P NEW LOW 30 MIN: CPT | Performed by: FAMILY MEDICINE

## 2025-02-28 SDOH — ECONOMIC STABILITY: FOOD INSECURITY: WITHIN THE PAST 12 MONTHS, YOU WORRIED THAT YOUR FOOD WOULD RUN OUT BEFORE YOU GOT MONEY TO BUY MORE.: NEVER TRUE

## 2025-02-28 SDOH — ECONOMIC STABILITY: FOOD INSECURITY: WITHIN THE PAST 12 MONTHS, THE FOOD YOU BOUGHT JUST DIDN'T LAST AND YOU DIDN'T HAVE MONEY TO GET MORE.: NEVER TRUE

## 2025-02-28 ASSESSMENT — ENCOUNTER SYMPTOMS
CHEST TIGHTNESS: 0
DIARRHEA: 0
VOMITING: 0
SHORTNESS OF BREATH: 0
NAUSEA: 0
WHEEZING: 0
COUGH: 0
EYE DISCHARGE: 0
ABDOMINAL PAIN: 0

## 2025-02-28 ASSESSMENT — PATIENT HEALTH QUESTIONNAIRE - PHQ9
SUM OF ALL RESPONSES TO PHQ9 QUESTIONS 1 & 2: 0
SUM OF ALL RESPONSES TO PHQ QUESTIONS 1-9: 0
2. FEELING DOWN, DEPRESSED OR HOPELESS: NOT AT ALL
SUM OF ALL RESPONSES TO PHQ QUESTIONS 1-9: 0
SUM OF ALL RESPONSES TO PHQ QUESTIONS 1-9: 0
1. LITTLE INTEREST OR PLEASURE IN DOING THINGS: NOT AT ALL
SUM OF ALL RESPONSES TO PHQ QUESTIONS 1-9: 0

## 2025-02-28 NOTE — PROGRESS NOTES
2025     Kristopher Pacheco (:  1990) is a 34 y.o. male, here for evaluation of the following medical concerns:    HPI  Patient presented today to establish care with a primary care physician at the [facility].  Patient is originally from [facility] but has been living here for years in fact he was born here.  He stated that he is a huge [employer] fan very knowledgeable concerning the team, he also likes to skateboard and works at the [employer].  He is  and wants to establish care so he can concentrate on his health and improving his overall wellbeing.  Patient is lean and does not need labs or refills of medications today.  Establish care-[facility]    Surgical history- septum surgery 2 years, moles removed, Dr. Stevenson, who is a dermatologist    Fractures- broken tibia fibula left leg when young, left elbow small fracture.     Family hx- grandmother type II diabetes, grandfather CABG, other than that very little family history.    Allergic rhinitis-patient uses Flonase when needed for this, states it is well-controlled at this time never had allergies until recently.    Gynecomastia-apparently has had this before but was concerned again did an exam and did not notice nodules or appreciate any type of masses, patient does not use illegal drugs does not drink or use tobacco.  This was documented in .    Today he denied chest pain, shortness of breath, nausea, vomiting, diarrhea.     Review of Systems   Constitutional:  Negative for activity change, fatigue, fever and unexpected weight change.   Eyes:  Negative for discharge and visual disturbance.   Respiratory:  Negative for cough, chest tightness, shortness of breath and wheezing.    Cardiovascular:  Negative for chest pain, palpitations and leg swelling.   Gastrointestinal:  Negative for abdominal pain, diarrhea, nausea and vomiting.   Musculoskeletal:  Positive for arthralgias.   Skin:  Negative

## 2025-03-01 PROBLEM — J30.2 SEASONAL ALLERGIC RHINITIS: Status: ACTIVE | Noted: 2025-03-01

## 2025-07-18 ENCOUNTER — OFFICE VISIT (OUTPATIENT)
Dept: PRIMARY CARE CLINIC | Age: 35
End: 2025-07-18
Payer: COMMERCIAL

## 2025-07-18 VITALS
BODY MASS INDEX: 25 KG/M2 | DIASTOLIC BLOOD PRESSURE: 70 MMHG | SYSTOLIC BLOOD PRESSURE: 104 MMHG | RESPIRATION RATE: 16 BRPM | OXYGEN SATURATION: 99 % | HEIGHT: 70 IN | TEMPERATURE: 97.3 F | WEIGHT: 174.6 LBS | HEART RATE: 52 BPM

## 2025-07-18 DIAGNOSIS — Z00.00 ADULT WELLNESS VISIT: ICD-10-CM

## 2025-07-18 DIAGNOSIS — Z00.00 ADULT WELLNESS VISIT: Primary | ICD-10-CM

## 2025-07-18 LAB
ALBUMIN SERPL-MCNC: 4.6 G/DL (ref 3.4–5)
ALBUMIN/GLOB SERPL: 1.8 {RATIO} (ref 1.1–2.2)
ALP SERPL-CCNC: 62 U/L (ref 40–129)
ALT SERPL-CCNC: 27 U/L (ref 10–40)
ANION GAP SERPL CALCULATED.3IONS-SCNC: 10 MMOL/L (ref 3–16)
AST SERPL-CCNC: 25 U/L (ref 15–37)
BASOPHILS # BLD: 0 K/UL (ref 0–0.2)
BASOPHILS NFR BLD: 0.9 %
BILIRUB SERPL-MCNC: 2.3 MG/DL (ref 0–1)
BUN SERPL-MCNC: 15 MG/DL (ref 7–20)
CALCIUM SERPL-MCNC: 9.7 MG/DL (ref 8.3–10.6)
CHLORIDE SERPL-SCNC: 105 MMOL/L (ref 99–110)
CHOLEST SERPL-MCNC: 176 MG/DL (ref 0–199)
CO2 SERPL-SCNC: 27 MMOL/L (ref 21–32)
CREAT SERPL-MCNC: 1 MG/DL (ref 0.9–1.3)
DEPRECATED RDW RBC AUTO: 13.5 % (ref 12.4–15.4)
EOSINOPHIL # BLD: 0.2 K/UL (ref 0–0.6)
EOSINOPHIL NFR BLD: 4.5 %
GFR SERPLBLD CREATININE-BSD FMLA CKD-EPI: >90 ML/MIN/{1.73_M2}
GLUCOSE SERPL-MCNC: 83 MG/DL (ref 70–99)
HCT VFR BLD AUTO: 40.4 % (ref 40.5–52.5)
HDLC SERPL-MCNC: 68 MG/DL (ref 40–60)
HGB BLD-MCNC: 14.1 G/DL (ref 13.5–17.5)
LDLC SERPL CALC-MCNC: 92 MG/DL
LYMPHOCYTES # BLD: 1.6 K/UL (ref 1–5.1)
LYMPHOCYTES NFR BLD: 40.9 %
MCH RBC QN AUTO: 31.3 PG (ref 26–34)
MCHC RBC AUTO-ENTMCNC: 35 G/DL (ref 31–36)
MCV RBC AUTO: 89.6 FL (ref 80–100)
MONOCYTES # BLD: 0.6 K/UL (ref 0–1.3)
MONOCYTES NFR BLD: 15 %
NEUTROPHILS # BLD: 1.5 K/UL (ref 1.7–7.7)
NEUTROPHILS NFR BLD: 38.7 %
PLATELET # BLD AUTO: 230 K/UL (ref 135–450)
PMV BLD AUTO: 8.9 FL (ref 5–10.5)
POTASSIUM SERPL-SCNC: 4.1 MMOL/L (ref 3.5–5.1)
PROT SERPL-MCNC: 7.2 G/DL (ref 6.4–8.2)
RBC # BLD AUTO: 4.51 M/UL (ref 4.2–5.9)
SODIUM SERPL-SCNC: 142 MMOL/L (ref 136–145)
TRIGL SERPL-MCNC: 78 MG/DL (ref 0–150)
VLDLC SERPL CALC-MCNC: 16 MG/DL
WBC # BLD AUTO: 3.9 K/UL (ref 4–11)

## 2025-07-18 PROCEDURE — 99395 PREV VISIT EST AGE 18-39: CPT | Performed by: FAMILY MEDICINE

## 2025-07-18 NOTE — PROGRESS NOTES
2025     Kristopher Pacheco (:  1990) is a 35 y.o. male, here for evaluation of the following medical concerns:    HPI  History of Present Illness  The patient presents for a routine checkup.    He reports no chest pain, shortness of breath, nausea, vomiting, or diarrhea. His bowel movements and urination are normal. He has no issues with hearing or vision. He has a history of septum surgery performed by Dr. Alcantara. He also mentions that his wife recently underwent partial thyroid removal due to cancer, which has been a source of stress for him. He has a history of broken ankle and elbow from skating.    FAMILY HISTORY  He is not aware of any family history of heart attack, strokes, or cancers.     Today he denied chest pain, shortness of breath, nausea, vomiting, diarrhea.   Review of Systems   Constitutional:  Negative for activity change, fatigue, fever and unexpected weight change.   HENT:  Negative for congestion, ear pain, facial swelling, hearing loss, rhinorrhea, sinus pressure, sore throat and trouble swallowing.    Eyes:  Negative for discharge and visual disturbance.   Respiratory:  Negative for cough, chest tightness, shortness of breath and wheezing.    Cardiovascular:  Negative for chest pain, palpitations and leg swelling.   Gastrointestinal:  Negative for abdominal pain, anal bleeding, blood in stool, diarrhea, nausea and vomiting.   Endocrine: Negative for cold intolerance, heat intolerance, polydipsia and polyphagia.   Genitourinary:  Negative for decreased urine volume, dysuria, frequency, genital sores, penile discharge, penile pain, testicular pain and urgency.   Musculoskeletal:  Negative for arthralgias.   Skin:  Negative for rash.   Allergic/Immunologic: Negative for food allergies.   Neurological:  Negative for dizziness, syncope, light-headedness and headaches.   Hematological:  Does not bruise/bleed easily.   Psychiatric/Behavioral:  Negative for suicidal ideas. The patient is

## 2025-07-19 ASSESSMENT — ENCOUNTER SYMPTOMS
TROUBLE SWALLOWING: 0
SHORTNESS OF BREATH: 0
COUGH: 0
NAUSEA: 0
WHEEZING: 0
BLOOD IN STOOL: 0
FACIAL SWELLING: 0
DIARRHEA: 0
CHEST TIGHTNESS: 0
SINUS PRESSURE: 0
RHINORRHEA: 0
SORE THROAT: 0
ANAL BLEEDING: 0
ABDOMINAL PAIN: 0
VOMITING: 0
EYE DISCHARGE: 0

## 2025-07-21 ENCOUNTER — PATIENT MESSAGE (OUTPATIENT)
Dept: PRIMARY CARE CLINIC | Age: 35
End: 2025-07-21